# Patient Record
Sex: FEMALE | Race: OTHER | HISPANIC OR LATINO
[De-identification: names, ages, dates, MRNs, and addresses within clinical notes are randomized per-mention and may not be internally consistent; named-entity substitution may affect disease eponyms.]

---

## 2021-08-02 PROBLEM — Z00.00 ENCOUNTER FOR PREVENTIVE HEALTH EXAMINATION: Status: ACTIVE | Noted: 2021-08-02

## 2021-08-12 ENCOUNTER — APPOINTMENT (OUTPATIENT)
Dept: OBGYN | Facility: CLINIC | Age: 31
End: 2021-08-12
Payer: COMMERCIAL

## 2021-08-12 ENCOUNTER — ASOB RESULT (OUTPATIENT)
Age: 31
End: 2021-08-12

## 2021-08-12 VITALS
WEIGHT: 159 LBS | SYSTOLIC BLOOD PRESSURE: 114 MMHG | DIASTOLIC BLOOD PRESSURE: 69 MMHG | BODY MASS INDEX: 26.49 KG/M2 | HEIGHT: 65 IN | HEART RATE: 83 BPM

## 2021-08-12 DIAGNOSIS — Z80.9 FAMILY HISTORY OF MALIGNANT NEOPLASM, UNSPECIFIED: ICD-10-CM

## 2021-08-12 DIAGNOSIS — Z78.9 OTHER SPECIFIED HEALTH STATUS: ICD-10-CM

## 2021-08-12 DIAGNOSIS — Z83.3 FAMILY HISTORY OF DIABETES MELLITUS: ICD-10-CM

## 2021-08-12 DIAGNOSIS — Z82.49 FAMILY HISTORY OF ISCHEMIC HEART DISEASE AND OTHER DISEASES OF THE CIRCULATORY SYSTEM: ICD-10-CM

## 2021-08-12 DIAGNOSIS — Z83.49 FAMILY HISTORY OF OTHER ENDOCRINE, NUTRITIONAL AND METABOLIC DISEASES: ICD-10-CM

## 2021-08-12 DIAGNOSIS — Z32.01 ENCOUNTER FOR PREGNANCY TEST, RESULT POSITIVE: ICD-10-CM

## 2021-08-12 PROCEDURE — 76801 OB US < 14 WKS SINGLE FETUS: CPT

## 2021-08-12 PROCEDURE — 99203 OFFICE O/P NEW LOW 30 MIN: CPT

## 2021-09-04 PROBLEM — Z82.49 FAMILY HISTORY OF HYPERTENSION: Status: ACTIVE | Noted: 2021-09-04

## 2021-09-04 PROBLEM — Z83.3 FAMILY HISTORY OF DIABETES MELLITUS: Status: ACTIVE | Noted: 2021-09-04

## 2021-09-04 PROBLEM — Z83.49 FAMILY HISTORY OF DWARFISM: Status: ACTIVE | Noted: 2021-09-04

## 2021-09-04 PROBLEM — Z80.9 FAMILY HISTORY OF MALIGNANT NEOPLASM: Status: ACTIVE | Noted: 2021-09-04

## 2021-09-04 PROBLEM — Z78.9 DOES NOT USE ILLICIT DRUGS: Status: ACTIVE | Noted: 2021-09-04

## 2021-09-04 PROBLEM — Z78.9 NO PERTINENT PAST MEDICAL HISTORY: Status: RESOLVED | Noted: 2021-09-04 | Resolved: 2021-09-04

## 2021-09-04 LAB
ABO + RH PNL BLD: NORMAL
AR GENE MUT ANL BLD/T: NORMAL
B19V IGG SER QL IA: 0.33 INDEX
B19V IGG+IGM SER-IMP: NEGATIVE
B19V IGG+IGM SER-IMP: NORMAL
B19V IGM FLD-ACNC: 0.2 INDEX
B19V IGM SER-ACNC: NEGATIVE
BACTERIA UR CULT: NORMAL
BASOPHILS # BLD AUTO: 0.02 K/UL
BASOPHILS NFR BLD AUTO: 0.3 %
BLD GP AB SCN SERPL QL: NORMAL
C TRACH RRNA SPEC QL NAA+PROBE: NOT DETECTED
CFTR MUT TESTED BLD/T: NEGATIVE
CMV IGG SERPL QL: 4.1 U/ML
CMV IGG SERPL-IMP: POSITIVE
CMV IGM SERPL QL: <8 AU/ML
CMV IGM SERPL QL: NEGATIVE
EOSINOPHIL # BLD AUTO: 0.05 K/UL
EOSINOPHIL NFR BLD AUTO: 0.7 %
FMR1 GENE MUT ANL BLD/T: NORMAL
HBV SURFACE AG SER QL: NONREACTIVE
HCT VFR BLD CALC: 45.5 %
HCV AB SER QL: NONREACTIVE
HCV S/CO RATIO: 0.1 S/CO
HGB A MFR BLD: 97 %
HGB A2 MFR BLD: 1.5 %
HGB BLD-MCNC: 15.2 G/DL
HGB F MFR BLD: 0.3 %
HGB FRACT BLD-IMP: NORMAL
HGB OTHER MFR BLD ELPH: 1.2 %
HIV1+2 AB SPEC QL IA.RAPID: NONREACTIVE
IMM GRANULOCYTES NFR BLD AUTO: 0.4 %
LEAD BLD-MCNC: <1 UG/DL
LYMPHOCYTES # BLD AUTO: 2.18 K/UL
LYMPHOCYTES NFR BLD AUTO: 30.8 %
MAN DIFF?: NORMAL
MCHC RBC-ENTMCNC: 32.2 PG
MCHC RBC-ENTMCNC: 33.4 GM/DL
MCV RBC AUTO: 96.4 FL
MONOCYTES # BLD AUTO: 0.87 K/UL
MONOCYTES NFR BLD AUTO: 12.3 %
N GONORRHOEA RRNA SPEC QL NAA+PROBE: NOT DETECTED
NEUTROPHILS # BLD AUTO: 3.93 K/UL
NEUTROPHILS NFR BLD AUTO: 55.5 %
PLATELET # BLD AUTO: 278 K/UL
RBC # BLD: 4.72 M/UL
RBC # FLD: 13.1 %
RUBV IGG FLD-ACNC: 17.2 INDEX
RUBV IGG SER-IMP: POSITIVE
SOURCE AMPLIFICATION: NORMAL
T PALLIDUM AB SER QL IA: NEGATIVE
VZV AB TITR SER: POSITIVE
VZV IGG SER IF-ACNC: 2101 INDEX
WBC # FLD AUTO: 7.08 K/UL

## 2021-09-04 RX ORDER — ASCORBIC ACID, CHOLECALCIFEROL, .ALPHA.-TOCOPHEROL ACETATE, DL-, PYRIDOXINE, FOLIC ACID, CYANOCOBALAMIN, CALCIUM, FERROUS FUMARATE, MAGNESIUM, DOCONEXENT 85; 200; 10; 25; 1; 12; 140; 27; 45; 300 [IU]/1; [IU]/1; [IU]/1; [IU]/1; MG/1; UG/1; MG/1; MG/1; MG/1; MG/1
27-0.6-0.4-3 CAPSULE, GELATIN COATED ORAL
Refills: 0 | Status: ACTIVE | COMMUNITY

## 2021-09-07 ENCOUNTER — APPOINTMENT (OUTPATIENT)
Dept: OBGYN | Facility: CLINIC | Age: 31
End: 2021-09-07
Payer: COMMERCIAL

## 2021-09-07 VITALS
SYSTOLIC BLOOD PRESSURE: 121 MMHG | WEIGHT: 160 LBS | BODY MASS INDEX: 26.66 KG/M2 | HEIGHT: 65 IN | DIASTOLIC BLOOD PRESSURE: 73 MMHG

## 2021-09-07 PROCEDURE — 0502F SUBSEQUENT PRENATAL CARE: CPT

## 2021-09-08 ENCOUNTER — APPOINTMENT (OUTPATIENT)
Dept: ANTEPARTUM | Facility: CLINIC | Age: 31
End: 2021-09-08

## 2021-09-13 ENCOUNTER — APPOINTMENT (OUTPATIENT)
Dept: ANTEPARTUM | Facility: CLINIC | Age: 31
End: 2021-09-13
Payer: COMMERCIAL

## 2021-09-13 ENCOUNTER — ASOB RESULT (OUTPATIENT)
Age: 31
End: 2021-09-13

## 2021-09-13 ENCOUNTER — TRANSCRIPTION ENCOUNTER (OUTPATIENT)
Age: 31
End: 2021-09-13

## 2021-09-13 PROCEDURE — 76813 OB US NUCHAL MEAS 1 GEST: CPT

## 2021-09-13 PROCEDURE — 76801 OB US < 14 WKS SINGLE FETUS: CPT

## 2021-09-13 PROCEDURE — 36416 COLLJ CAPILLARY BLOOD SPEC: CPT

## 2021-09-20 ENCOUNTER — APPOINTMENT (OUTPATIENT)
Dept: MATERNAL FETAL MEDICINE | Facility: CLINIC | Age: 31
End: 2021-09-20
Payer: COMMERCIAL

## 2021-09-20 ENCOUNTER — ASOB RESULT (OUTPATIENT)
Age: 31
End: 2021-09-20

## 2021-09-20 PROCEDURE — 99202 OFFICE O/P NEW SF 15 MIN: CPT | Mod: 95

## 2021-10-04 ENCOUNTER — APPOINTMENT (OUTPATIENT)
Dept: MATERNAL FETAL MEDICINE | Facility: CLINIC | Age: 31
End: 2021-10-04

## 2021-10-12 ENCOUNTER — APPOINTMENT (OUTPATIENT)
Dept: OBGYN | Facility: CLINIC | Age: 31
End: 2021-10-12
Payer: COMMERCIAL

## 2021-10-12 VITALS
TEMPERATURE: 96.5 F | WEIGHT: 163 LBS | HEART RATE: 74 BPM | BODY MASS INDEX: 27.16 KG/M2 | SYSTOLIC BLOOD PRESSURE: 119 MMHG | DIASTOLIC BLOOD PRESSURE: 72 MMHG | HEIGHT: 65 IN

## 2021-10-12 PROCEDURE — 0502F SUBSEQUENT PRENATAL CARE: CPT

## 2021-11-01 ENCOUNTER — APPOINTMENT (OUTPATIENT)
Dept: ANTEPARTUM | Facility: CLINIC | Age: 31
End: 2021-11-01
Payer: COMMERCIAL

## 2021-11-01 ENCOUNTER — ASOB RESULT (OUTPATIENT)
Age: 31
End: 2021-11-01

## 2021-11-01 PROCEDURE — 76805 OB US >/= 14 WKS SNGL FETUS: CPT

## 2021-11-09 ENCOUNTER — NON-APPOINTMENT (OUTPATIENT)
Age: 31
End: 2021-11-09

## 2021-11-09 ENCOUNTER — APPOINTMENT (OUTPATIENT)
Dept: OBGYN | Facility: CLINIC | Age: 31
End: 2021-11-09
Payer: COMMERCIAL

## 2021-11-09 VITALS
HEIGHT: 65 IN | WEIGHT: 170 LBS | DIASTOLIC BLOOD PRESSURE: 72 MMHG | BODY MASS INDEX: 28.32 KG/M2 | SYSTOLIC BLOOD PRESSURE: 113 MMHG

## 2021-11-09 PROCEDURE — 0502F SUBSEQUENT PRENATAL CARE: CPT

## 2021-12-09 ENCOUNTER — APPOINTMENT (OUTPATIENT)
Dept: OBGYN | Facility: CLINIC | Age: 31
End: 2021-12-09
Payer: COMMERCIAL

## 2021-12-09 VITALS
DIASTOLIC BLOOD PRESSURE: 75 MMHG | BODY MASS INDEX: 28.99 KG/M2 | HEART RATE: 78 BPM | SYSTOLIC BLOOD PRESSURE: 113 MMHG | HEIGHT: 65 IN | WEIGHT: 174 LBS

## 2021-12-09 DIAGNOSIS — Z34.02 ENCOUNTER FOR SUPERVISION OF NORMAL FIRST PREGNANCY, SECOND TRIMESTER: ICD-10-CM

## 2021-12-09 PROCEDURE — 0502F SUBSEQUENT PRENATAL CARE: CPT

## 2021-12-09 RX ORDER — VITAMIN C, CALCIUM, IRON, VITAMIN D3, VITAMIN E, THIAMIN, RIBOFLAVIN, NIACINAMIDE, VITAMIN B6, FOLIC ACID, IODINE, ZINC, COPPER, DOCUSATE SODIUM
27-1 & 250 KIT
Qty: 30 | Refills: 6 | Status: ACTIVE | COMMUNITY
Start: 2021-12-09 | End: 1900-01-01

## 2022-01-06 ENCOUNTER — NON-APPOINTMENT (OUTPATIENT)
Age: 32
End: 2022-01-06

## 2022-01-06 ENCOUNTER — APPOINTMENT (OUTPATIENT)
Dept: OBGYN | Facility: CLINIC | Age: 32
End: 2022-01-06
Payer: COMMERCIAL

## 2022-01-06 VITALS
BODY MASS INDEX: 29.66 KG/M2 | WEIGHT: 178 LBS | SYSTOLIC BLOOD PRESSURE: 123 MMHG | HEIGHT: 65 IN | DIASTOLIC BLOOD PRESSURE: 83 MMHG

## 2022-01-06 DIAGNOSIS — Z23 ENCOUNTER FOR IMMUNIZATION: ICD-10-CM

## 2022-01-06 PROCEDURE — 0502F SUBSEQUENT PRENATAL CARE: CPT

## 2022-01-19 ENCOUNTER — APPOINTMENT (OUTPATIENT)
Dept: OBGYN | Facility: CLINIC | Age: 32
End: 2022-01-19

## 2022-01-20 ENCOUNTER — APPOINTMENT (OUTPATIENT)
Dept: ANTEPARTUM | Facility: CLINIC | Age: 32
End: 2022-01-20
Payer: COMMERCIAL

## 2022-01-20 ENCOUNTER — ASOB RESULT (OUTPATIENT)
Age: 32
End: 2022-01-20

## 2022-01-20 PROCEDURE — 76819 FETAL BIOPHYS PROFIL W/O NST: CPT

## 2022-01-20 PROCEDURE — 76816 OB US FOLLOW-UP PER FETUS: CPT

## 2022-01-21 ENCOUNTER — APPOINTMENT (OUTPATIENT)
Dept: OBGYN | Facility: CLINIC | Age: 32
End: 2022-01-21
Payer: COMMERCIAL

## 2022-01-21 VITALS
WEIGHT: 187 LBS | DIASTOLIC BLOOD PRESSURE: 75 MMHG | HEIGHT: 65 IN | BODY MASS INDEX: 31.16 KG/M2 | HEART RATE: 106 BPM | SYSTOLIC BLOOD PRESSURE: 111 MMHG

## 2022-01-21 PROCEDURE — 0502F SUBSEQUENT PRENATAL CARE: CPT

## 2022-02-01 ENCOUNTER — APPOINTMENT (OUTPATIENT)
Dept: OBGYN | Facility: CLINIC | Age: 32
End: 2022-02-01
Payer: COMMERCIAL

## 2022-02-01 VITALS
SYSTOLIC BLOOD PRESSURE: 114 MMHG | HEIGHT: 65 IN | WEIGHT: 185 LBS | HEART RATE: 90 BPM | DIASTOLIC BLOOD PRESSURE: 80 MMHG | BODY MASS INDEX: 30.82 KG/M2

## 2022-02-01 DIAGNOSIS — O36.60X0 MATERNAL CARE FOR EXCESSIVE FETAL GROWTH, UNSPECIFIED TRIMESTER, NOT APPLICABLE OR UNSPECIFIED: ICD-10-CM

## 2022-02-01 PROCEDURE — 0502F SUBSEQUENT PRENATAL CARE: CPT

## 2022-02-03 ENCOUNTER — TRANSCRIPTION ENCOUNTER (OUTPATIENT)
Age: 32
End: 2022-02-03

## 2022-02-15 ENCOUNTER — APPOINTMENT (OUTPATIENT)
Dept: OBGYN | Facility: CLINIC | Age: 32
End: 2022-02-15
Payer: COMMERCIAL

## 2022-02-15 VITALS
HEIGHT: 65 IN | HEART RATE: 84 BPM | BODY MASS INDEX: 31.49 KG/M2 | WEIGHT: 189 LBS | SYSTOLIC BLOOD PRESSURE: 116 MMHG | DIASTOLIC BLOOD PRESSURE: 82 MMHG

## 2022-02-15 LAB
1ST TRIMESTER DATA: NORMAL
2ND TRIMESTER DATA: NORMAL
AFP PNL SERPL: NORMAL
AFP SERPL-ACNC: NORMAL
AFP SERPL-ACNC: NORMAL
B-HCG FREE SERPL-MCNC: NORMAL
BASOPHILS # BLD AUTO: 0.05 K/UL
BASOPHILS NFR BLD AUTO: 0.5 %
CLINICAL BIOCHEMIST REVIEW: NORMAL
EOSINOPHIL # BLD AUTO: 0.07 K/UL
EOSINOPHIL NFR BLD AUTO: 0.6 %
FREE BETA HCG 1ST TRIMESTER: NORMAL
GLUCOSE 1H P 100 G GLC PO SERPL-MCNC: 158 MG/DL
GLUCOSE 1H P 50 G GLC PO SERPL-MCNC: 59 MG/DL
GLUCOSE 2H P CHAL SERPL-MCNC: 113 MG/DL
GLUCOSE 3H P CHAL SERPL-MCNC: 80 MG/DL
GLUCOSE BS SERPL-MCNC: 77 MG/DL
HCT VFR BLD CALC: 38.1 %
HGB BLD-MCNC: 12.5 G/DL
IMM GRANULOCYTES NFR BLD AUTO: 2.6 %
INHIBIN A SERPL-MCNC: NORMAL
LYMPHOCYTES # BLD AUTO: 1.99 K/UL
LYMPHOCYTES NFR BLD AUTO: 18.2 %
MAN DIFF?: NORMAL
MCHC RBC-ENTMCNC: 32.8 GM/DL
MCHC RBC-ENTMCNC: 32.8 PG
MCV RBC AUTO: 100 FL
MONOCYTES # BLD AUTO: 1.06 K/UL
MONOCYTES NFR BLD AUTO: 9.7 %
NEUTROPHILS # BLD AUTO: 7.5 K/UL
NEUTROPHILS NFR BLD AUTO: 68.4 %
NOTES NTD: NORMAL
NT: NORMAL
PAPP-A SERPL-ACNC: NORMAL
PLATELET # BLD AUTO: 264 K/UL
RBC # BLD: 3.81 M/UL
RBC # FLD: 13.6 %
T PALLIDUM AB SER QL IA: NEGATIVE
U ESTRIOL SERPL-SCNC: NORMAL
WBC # FLD AUTO: 10.95 K/UL

## 2022-02-15 PROCEDURE — 0502F SUBSEQUENT PRENATAL CARE: CPT

## 2022-02-23 ENCOUNTER — APPOINTMENT (OUTPATIENT)
Dept: OBGYN | Facility: CLINIC | Age: 32
End: 2022-02-23
Payer: COMMERCIAL

## 2022-02-23 VITALS
WEIGHT: 192.5 LBS | SYSTOLIC BLOOD PRESSURE: 132 MMHG | TEMPERATURE: 98.4 F | HEART RATE: 97 BPM | DIASTOLIC BLOOD PRESSURE: 81 MMHG | BODY MASS INDEX: 32.07 KG/M2 | HEIGHT: 65 IN

## 2022-02-23 VITALS — HEART RATE: 85 BPM | SYSTOLIC BLOOD PRESSURE: 117 MMHG | DIASTOLIC BLOOD PRESSURE: 77 MMHG

## 2022-02-23 PROCEDURE — 0502F SUBSEQUENT PRENATAL CARE: CPT

## 2022-03-03 ENCOUNTER — APPOINTMENT (OUTPATIENT)
Dept: OBGYN | Facility: CLINIC | Age: 32
End: 2022-03-03

## 2022-03-03 ENCOUNTER — NON-APPOINTMENT (OUTPATIENT)
Age: 32
End: 2022-03-03

## 2022-03-03 VITALS
BODY MASS INDEX: 31.99 KG/M2 | HEIGHT: 65 IN | WEIGHT: 192 LBS | SYSTOLIC BLOOD PRESSURE: 124 MMHG | HEART RATE: 82 BPM | DIASTOLIC BLOOD PRESSURE: 83 MMHG

## 2022-03-07 LAB
GP B STREP DNA SPEC QL NAA+PROBE: NORMAL
GP B STREP DNA SPEC QL NAA+PROBE: NOT DETECTED
SOURCE GBS: NORMAL

## 2022-03-10 ENCOUNTER — APPOINTMENT (OUTPATIENT)
Dept: OBGYN | Facility: CLINIC | Age: 32
End: 2022-03-10
Payer: COMMERCIAL

## 2022-03-10 VITALS
DIASTOLIC BLOOD PRESSURE: 83 MMHG | WEIGHT: 193 LBS | HEART RATE: 91 BPM | BODY MASS INDEX: 32.15 KG/M2 | SYSTOLIC BLOOD PRESSURE: 122 MMHG | HEIGHT: 65 IN

## 2022-03-10 PROCEDURE — 0502F SUBSEQUENT PRENATAL CARE: CPT

## 2022-03-14 ENCOUNTER — NON-APPOINTMENT (OUTPATIENT)
Age: 32
End: 2022-03-14

## 2022-03-15 ENCOUNTER — APPOINTMENT (OUTPATIENT)
Dept: OBGYN | Facility: CLINIC | Age: 32
End: 2022-03-15

## 2022-03-15 VITALS
TEMPERATURE: 96.5 F | DIASTOLIC BLOOD PRESSURE: 86 MMHG | HEART RATE: 84 BPM | WEIGHT: 195 LBS | HEIGHT: 65 IN | SYSTOLIC BLOOD PRESSURE: 131 MMHG | BODY MASS INDEX: 32.49 KG/M2

## 2022-03-15 VITALS — SYSTOLIC BLOOD PRESSURE: 121 MMHG | DIASTOLIC BLOOD PRESSURE: 82 MMHG | HEART RATE: 83 BPM

## 2022-03-17 ENCOUNTER — APPOINTMENT (OUTPATIENT)
Dept: ANTEPARTUM | Facility: CLINIC | Age: 32
End: 2022-03-17

## 2022-03-17 ENCOUNTER — ASOB RESULT (OUTPATIENT)
Age: 32
End: 2022-03-17

## 2022-03-17 ENCOUNTER — APPOINTMENT (OUTPATIENT)
Dept: ANTEPARTUM | Facility: CLINIC | Age: 32
End: 2022-03-17
Payer: COMMERCIAL

## 2022-03-17 PROCEDURE — 76816 OB US FOLLOW-UP PER FETUS: CPT

## 2022-03-17 PROCEDURE — 76818 FETAL BIOPHYS PROFILE W/NST: CPT

## 2022-03-21 ENCOUNTER — ASOB RESULT (OUTPATIENT)
Age: 32
End: 2022-03-21

## 2022-03-21 ENCOUNTER — APPOINTMENT (OUTPATIENT)
Dept: ANTEPARTUM | Facility: CLINIC | Age: 32
End: 2022-03-21
Payer: COMMERCIAL

## 2022-03-21 ENCOUNTER — NON-APPOINTMENT (OUTPATIENT)
Age: 32
End: 2022-03-21

## 2022-03-21 PROCEDURE — 76818 FETAL BIOPHYS PROFILE W/NST: CPT

## 2022-03-22 ENCOUNTER — INPATIENT (INPATIENT)
Facility: HOSPITAL | Age: 32
LOS: 2 days | Discharge: HOME HEALTH | End: 2022-03-25
Attending: OBSTETRICS & GYNECOLOGY | Admitting: OBSTETRICS & GYNECOLOGY
Payer: COMMERCIAL

## 2022-03-22 VITALS
OXYGEN SATURATION: 100 % | HEIGHT: 65 IN | TEMPERATURE: 98 F | RESPIRATION RATE: 16 BRPM | HEART RATE: 96 BPM | WEIGHT: 158.07 LBS | DIASTOLIC BLOOD PRESSURE: 90 MMHG | SYSTOLIC BLOOD PRESSURE: 138 MMHG

## 2022-03-22 DIAGNOSIS — O48.0 POST-TERM PREGNANCY: ICD-10-CM

## 2022-03-22 LAB
BASOPHILS # BLD AUTO: 0.05 K/UL — SIGNIFICANT CHANGE UP (ref 0–0.2)
BASOPHILS NFR BLD AUTO: 0.5 % — SIGNIFICANT CHANGE UP (ref 0–2)
EOSINOPHIL # BLD AUTO: 0.07 K/UL — SIGNIFICANT CHANGE UP (ref 0–0.5)
EOSINOPHIL NFR BLD AUTO: 0.7 % — SIGNIFICANT CHANGE UP (ref 0–6)
HCT VFR BLD CALC: 40.5 % — SIGNIFICANT CHANGE UP (ref 34.5–45)
HGB BLD-MCNC: 13.2 G/DL — SIGNIFICANT CHANGE UP (ref 11.5–15.5)
IANC: 6.21 K/UL — SIGNIFICANT CHANGE UP (ref 1.5–8.5)
IMM GRANULOCYTES NFR BLD AUTO: 1.1 % — SIGNIFICANT CHANGE UP (ref 0–1.5)
LYMPHOCYTES # BLD AUTO: 3.3 K/UL — SIGNIFICANT CHANGE UP (ref 1–3.3)
LYMPHOCYTES # BLD AUTO: 30.9 % — SIGNIFICANT CHANGE UP (ref 13–44)
MCHC RBC-ENTMCNC: 30.1 PG — SIGNIFICANT CHANGE UP (ref 27–34)
MCHC RBC-ENTMCNC: 32.6 GM/DL — SIGNIFICANT CHANGE UP (ref 32–36)
MCV RBC AUTO: 92.5 FL — SIGNIFICANT CHANGE UP (ref 80–100)
MONOCYTES # BLD AUTO: 0.93 K/UL — HIGH (ref 0–0.9)
MONOCYTES NFR BLD AUTO: 8.7 % — SIGNIFICANT CHANGE UP (ref 2–14)
NEUTROPHILS # BLD AUTO: 6.21 K/UL — SIGNIFICANT CHANGE UP (ref 1.8–7.4)
NEUTROPHILS NFR BLD AUTO: 58.1 % — SIGNIFICANT CHANGE UP (ref 43–77)
NRBC # BLD: 0 /100 WBCS — SIGNIFICANT CHANGE UP
NRBC # FLD: 0 K/UL — SIGNIFICANT CHANGE UP
PLATELET # BLD AUTO: 253 K/UL — SIGNIFICANT CHANGE UP (ref 150–400)
RBC # BLD: 4.38 M/UL — SIGNIFICANT CHANGE UP (ref 3.8–5.2)
RBC # FLD: 15 % — HIGH (ref 10.3–14.5)
WBC # BLD: 10.68 K/UL — HIGH (ref 3.8–10.5)
WBC # FLD AUTO: 10.68 K/UL — HIGH (ref 3.8–10.5)

## 2022-03-22 RX ORDER — CITRIC ACID/SODIUM CITRATE 300-500 MG
15 SOLUTION, ORAL ORAL EVERY 6 HOURS
Refills: 0 | Status: DISCONTINUED | OUTPATIENT
Start: 2022-03-22 | End: 2022-03-23

## 2022-03-22 RX ORDER — SODIUM CHLORIDE 9 MG/ML
1000 INJECTION, SOLUTION INTRAVENOUS
Refills: 0 | Status: DISCONTINUED | OUTPATIENT
Start: 2022-03-22 | End: 2022-03-23

## 2022-03-22 RX ORDER — OXYTOCIN 10 UNIT/ML
333.33 VIAL (ML) INJECTION
Qty: 20 | Refills: 0 | Status: DISCONTINUED | OUTPATIENT
Start: 2022-03-22 | End: 2022-03-23

## 2022-03-22 RX ADMIN — SODIUM CHLORIDE 125 MILLILITER(S): 9 INJECTION, SOLUTION INTRAVENOUS at 23:52

## 2022-03-22 NOTE — OB RN PATIENT PROFILE - MENTAL HEALTH CONDITIONS/SYMPTOMS, PROFILE
none pt with 1.5 inch lac to back of his head on the right side. pt states the last thing he remembered was working on a part and then he woke up in the hospital with no memory of what happened. pt denies incontinence. pt reports hx of one prior seizure. pt had some nausea and dizziness prior to seizure and has not eaten since last night. pt is on plavix and reports biting his tounge

## 2022-03-22 NOTE — OB PROVIDER H&P - HISTORY OF PRESENT ILLNESS
HPI: Pt is a 31y  @ 41.0 presenting for LT-iol  FM (+)  LOF (-)  CTX (-)  VB (-)  GBS neg  EFW: 4400 (3/21). Negative 3hr GTT    PNC uncomplicated     OBHx: Above, D&C x2   GynHx: Denies hx of fibroids, STDs, or other gynecologic issues  PMHx: Denies  PSHx: D&C x2   Med: PNV  All: NKDA  Psych: Denies hx of mental health issues  SH: Denies hx of smoking, drinking, or drug usage during the pregnancy    Vital Signs Last 24 Hrs  T(C): 36.8 (22 Mar 2022 22:15), Max: 36.8 (22 Mar 2022 22:15)  T(F): 98.2 (22 Mar 2022 22:15), Max: 98.2 (22 Mar 2022 22:15)  HR: 96 (22 Mar 2022 22:19) (96 - 96)  BP: 138/90 (22 Mar 2022 22:19) (138/90 - 138/90)  BP(mean): --  RR: 16 (22 Mar 2022 22:15) (16 - 16)  SpO2: 98% (22 Mar 2022 22:18) (98% - 100%)    SVE: /50/-3  FHT: 150/mod/(+)accels/(-)decels  [place]: Irreg   [place]: Vertex

## 2022-03-22 NOTE — OB PROVIDER H&P - ASSESSMENT
A/P: Pt is a 31y  who presents for LT-iol    1. Admit to LND. Routine Labs. IVF  2. IOL via PO Misoprostol and CRB when amendable   3. Fetus: Cat 1 tracing, Vertex  4. Macrosomia, will place 2U of pRBCs on hold   5. GBS status neg  6. Pain: IV pain meds/epidural PRN  7. PNC   - HbsAg and HIV negative     Buster Martinez, PGY-1  Obstetrics and Gynecology    Discussed with Dr. Paz

## 2022-03-23 ENCOUNTER — TRANSCRIPTION ENCOUNTER (OUTPATIENT)
Age: 32
End: 2022-03-23

## 2022-03-23 LAB
ALBUMIN SERPL ELPH-MCNC: 3 G/DL — LOW (ref 3.3–5)
ALBUMIN SERPL ELPH-MCNC: 3.4 G/DL — SIGNIFICANT CHANGE UP (ref 3.3–5)
ALP SERPL-CCNC: 242 U/L — HIGH (ref 40–120)
ALP SERPL-CCNC: 281 U/L — HIGH (ref 40–120)
ALT FLD-CCNC: 395 U/L — HIGH (ref 4–33)
ALT FLD-CCNC: 441 U/L — HIGH (ref 4–33)
ANION GAP SERPL CALC-SCNC: 13 MMOL/L — SIGNIFICANT CHANGE UP (ref 7–14)
ANION GAP SERPL CALC-SCNC: 14 MMOL/L — SIGNIFICANT CHANGE UP (ref 7–14)
APPEARANCE UR: CLEAR — SIGNIFICANT CHANGE UP
APTT BLD: 27 SEC — SIGNIFICANT CHANGE UP (ref 27–36.3)
APTT BLD: 27.2 SEC — SIGNIFICANT CHANGE UP (ref 27–36.3)
AST SERPL-CCNC: 200 U/L — HIGH (ref 4–32)
AST SERPL-CCNC: 221 U/L — HIGH (ref 4–32)
BASOPHILS # BLD AUTO: 0.04 K/UL — SIGNIFICANT CHANGE UP (ref 0–0.2)
BASOPHILS # BLD AUTO: 0.05 K/UL — SIGNIFICANT CHANGE UP (ref 0–0.2)
BASOPHILS NFR BLD AUTO: 0.2 % — SIGNIFICANT CHANGE UP (ref 0–2)
BASOPHILS NFR BLD AUTO: 0.3 % — SIGNIFICANT CHANGE UP (ref 0–2)
BILIRUB SERPL-MCNC: 0.3 MG/DL — SIGNIFICANT CHANGE UP (ref 0.2–1.2)
BILIRUB SERPL-MCNC: 0.3 MG/DL — SIGNIFICANT CHANGE UP (ref 0.2–1.2)
BILIRUB UR-MCNC: NEGATIVE — SIGNIFICANT CHANGE UP
BLD GP AB SCN SERPL QL: NEGATIVE — SIGNIFICANT CHANGE UP
BUN SERPL-MCNC: 11 MG/DL — SIGNIFICANT CHANGE UP (ref 7–23)
BUN SERPL-MCNC: 12 MG/DL — SIGNIFICANT CHANGE UP (ref 7–23)
CALCIUM SERPL-MCNC: 9.3 MG/DL — SIGNIFICANT CHANGE UP (ref 8.4–10.5)
CALCIUM SERPL-MCNC: 9.6 MG/DL — SIGNIFICANT CHANGE UP (ref 8.4–10.5)
CHLORIDE SERPL-SCNC: 106 MMOL/L — SIGNIFICANT CHANGE UP (ref 98–107)
CHLORIDE SERPL-SCNC: 106 MMOL/L — SIGNIFICANT CHANGE UP (ref 98–107)
CO2 SERPL-SCNC: 16 MMOL/L — LOW (ref 22–31)
CO2 SERPL-SCNC: 16 MMOL/L — LOW (ref 22–31)
COLOR SPEC: SIGNIFICANT CHANGE UP
COVID-19 SPIKE DOMAIN AB INTERP: POSITIVE
COVID-19 SPIKE DOMAIN ANTIBODY RESULT: >250 U/ML — HIGH
CREAT ?TM UR-MCNC: 32 MG/DL — SIGNIFICANT CHANGE UP
CREAT SERPL-MCNC: 0.9 MG/DL — SIGNIFICANT CHANGE UP (ref 0.5–1.3)
CREAT SERPL-MCNC: 0.95 MG/DL — SIGNIFICANT CHANGE UP (ref 0.5–1.3)
DIFF PNL FLD: ABNORMAL
EGFR: 83 ML/MIN/1.73M2 — SIGNIFICANT CHANGE UP
EGFR: 89 ML/MIN/1.73M2 — SIGNIFICANT CHANGE UP
EOSINOPHIL # BLD AUTO: 0 K/UL — SIGNIFICANT CHANGE UP (ref 0–0.5)
EOSINOPHIL # BLD AUTO: 0 K/UL — SIGNIFICANT CHANGE UP (ref 0–0.5)
EOSINOPHIL NFR BLD AUTO: 0 % — SIGNIFICANT CHANGE UP (ref 0–6)
EOSINOPHIL NFR BLD AUTO: 0 % — SIGNIFICANT CHANGE UP (ref 0–6)
FIBRINOGEN PPP-MCNC: 535 MG/DL — HIGH (ref 330–520)
FIBRINOGEN PPP-MCNC: 573 MG/DL — HIGH (ref 330–520)
GLUCOSE SERPL-MCNC: 101 MG/DL — HIGH (ref 70–99)
GLUCOSE SERPL-MCNC: 85 MG/DL — SIGNIFICANT CHANGE UP (ref 70–99)
GLUCOSE UR QL: NEGATIVE — SIGNIFICANT CHANGE UP
HCT VFR BLD CALC: 38.1 % — SIGNIFICANT CHANGE UP (ref 34.5–45)
HCT VFR BLD CALC: 42.4 % — SIGNIFICANT CHANGE UP (ref 34.5–45)
HGB BLD-MCNC: 13.2 G/DL — SIGNIFICANT CHANGE UP (ref 11.5–15.5)
HGB BLD-MCNC: 14.1 G/DL — SIGNIFICANT CHANGE UP (ref 11.5–15.5)
IANC: 10.25 K/UL — HIGH (ref 1.8–7.4)
IANC: 15.58 K/UL — HIGH (ref 1.8–7.4)
IMM GRANULOCYTES NFR BLD AUTO: 1.2 % — SIGNIFICANT CHANGE UP (ref 0–1.5)
IMM GRANULOCYTES NFR BLD AUTO: 1.3 % — SIGNIFICANT CHANGE UP (ref 0–1.5)
INR BLD: 0.91 RATIO — SIGNIFICANT CHANGE UP (ref 0.88–1.16)
INR BLD: 0.93 RATIO — SIGNIFICANT CHANGE UP (ref 0.88–1.16)
KETONES UR-MCNC: NEGATIVE — SIGNIFICANT CHANGE UP
LDH SERPL L TO P-CCNC: 325 U/L — HIGH (ref 135–225)
LDH SERPL L TO P-CCNC: 340 U/L — HIGH (ref 135–225)
LEUKOCYTE ESTERASE UR-ACNC: NEGATIVE — SIGNIFICANT CHANGE UP
LYMPHOCYTES # BLD AUTO: 1.61 K/UL — SIGNIFICANT CHANGE UP (ref 1–3.3)
LYMPHOCYTES # BLD AUTO: 21.3 % — SIGNIFICANT CHANGE UP (ref 13–44)
LYMPHOCYTES # BLD AUTO: 3.17 K/UL — SIGNIFICANT CHANGE UP (ref 1–3.3)
LYMPHOCYTES # BLD AUTO: 8.3 % — LOW (ref 13–44)
MCHC RBC-ENTMCNC: 30.6 PG — SIGNIFICANT CHANGE UP (ref 27–34)
MCHC RBC-ENTMCNC: 31.4 PG — SIGNIFICANT CHANGE UP (ref 27–34)
MCHC RBC-ENTMCNC: 33.3 GM/DL — SIGNIFICANT CHANGE UP (ref 32–36)
MCHC RBC-ENTMCNC: 34.6 GM/DL — SIGNIFICANT CHANGE UP (ref 32–36)
MCV RBC AUTO: 90.5 FL — SIGNIFICANT CHANGE UP (ref 80–100)
MCV RBC AUTO: 92 FL — SIGNIFICANT CHANGE UP (ref 80–100)
MONOCYTES # BLD AUTO: 1.21 K/UL — HIGH (ref 0–0.9)
MONOCYTES # BLD AUTO: 1.9 K/UL — HIGH (ref 0–0.9)
MONOCYTES NFR BLD AUTO: 8.1 % — SIGNIFICANT CHANGE UP (ref 2–14)
MONOCYTES NFR BLD AUTO: 9.8 % — SIGNIFICANT CHANGE UP (ref 2–14)
NEUTROPHILS # BLD AUTO: 10.25 K/UL — HIGH (ref 1.8–7.4)
NEUTROPHILS # BLD AUTO: 15.58 K/UL — HIGH (ref 1.8–7.4)
NEUTROPHILS NFR BLD AUTO: 69.1 % — SIGNIFICANT CHANGE UP (ref 43–77)
NEUTROPHILS NFR BLD AUTO: 80.4 % — HIGH (ref 43–77)
NITRITE UR-MCNC: NEGATIVE — SIGNIFICANT CHANGE UP
NRBC # BLD: 0 /100 WBCS — SIGNIFICANT CHANGE UP
NRBC # BLD: 0 /100 WBCS — SIGNIFICANT CHANGE UP
NRBC # FLD: 0 K/UL — SIGNIFICANT CHANGE UP
NRBC # FLD: 0 K/UL — SIGNIFICANT CHANGE UP
PH UR: 6.5 — SIGNIFICANT CHANGE UP (ref 5–8)
PLATELET # BLD AUTO: 251 K/UL — SIGNIFICANT CHANGE UP (ref 150–400)
PLATELET # BLD AUTO: 256 K/UL — SIGNIFICANT CHANGE UP (ref 150–400)
POTASSIUM SERPL-MCNC: 4.1 MMOL/L — SIGNIFICANT CHANGE UP (ref 3.5–5.3)
POTASSIUM SERPL-MCNC: 4.5 MMOL/L — SIGNIFICANT CHANGE UP (ref 3.5–5.3)
POTASSIUM SERPL-SCNC: 4.1 MMOL/L — SIGNIFICANT CHANGE UP (ref 3.5–5.3)
POTASSIUM SERPL-SCNC: 4.5 MMOL/L — SIGNIFICANT CHANGE UP (ref 3.5–5.3)
PROT ?TM UR-MCNC: 7 MG/DL — SIGNIFICANT CHANGE UP
PROT ?TM UR-MCNC: 7 MG/DL — SIGNIFICANT CHANGE UP
PROT SERPL-MCNC: 5.8 G/DL — LOW (ref 6–8.3)
PROT SERPL-MCNC: 6.4 G/DL — SIGNIFICANT CHANGE UP (ref 6–8.3)
PROT UR-MCNC: NEGATIVE — SIGNIFICANT CHANGE UP
PROT/CREAT UR-RTO: 0.2 RATIO — SIGNIFICANT CHANGE UP (ref 0–0.2)
PROTHROM AB SERPL-ACNC: 10.5 SEC — SIGNIFICANT CHANGE UP (ref 10.5–13.4)
PROTHROM AB SERPL-ACNC: 10.8 SEC — SIGNIFICANT CHANGE UP (ref 10.5–13.4)
RBC # BLD: 4.21 M/UL — SIGNIFICANT CHANGE UP (ref 3.8–5.2)
RBC # BLD: 4.61 M/UL — SIGNIFICANT CHANGE UP (ref 3.8–5.2)
RBC # FLD: 15.1 % — HIGH (ref 10.3–14.5)
RBC # FLD: 15.3 % — HIGH (ref 10.3–14.5)
RH IG SCN BLD-IMP: POSITIVE — SIGNIFICANT CHANGE UP
RH IG SCN BLD-IMP: POSITIVE — SIGNIFICANT CHANGE UP
SARS-COV-2 IGG+IGM SERPL QL IA: >250 U/ML — HIGH
SARS-COV-2 IGG+IGM SERPL QL IA: POSITIVE
SARS-COV-2 RNA SPEC QL NAA+PROBE: SIGNIFICANT CHANGE UP
SODIUM SERPL-SCNC: 135 MMOL/L — SIGNIFICANT CHANGE UP (ref 135–145)
SODIUM SERPL-SCNC: 136 MMOL/L — SIGNIFICANT CHANGE UP (ref 135–145)
SP GR SPEC: 1.01 — SIGNIFICANT CHANGE UP (ref 1–1.05)
T PALLIDUM AB TITR SER: NEGATIVE — SIGNIFICANT CHANGE UP
URATE SERPL-MCNC: 4.9 MG/DL — SIGNIFICANT CHANGE UP (ref 2.5–7)
UROBILINOGEN FLD QL: SIGNIFICANT CHANGE UP
WBC # BLD: 15.18 K/UL — HIGH (ref 3.8–10.5)
WBC # BLD: 19.38 K/UL — HIGH (ref 3.8–10.5)
WBC # FLD AUTO: 15.18 K/UL — HIGH (ref 3.8–10.5)
WBC # FLD AUTO: 19.38 K/UL — HIGH (ref 3.8–10.5)

## 2022-03-23 PROCEDURE — 59400 OBSTETRICAL CARE: CPT | Mod: U9,UB,GC

## 2022-03-23 RX ORDER — IBUPROFEN 200 MG
1 TABLET ORAL
Qty: 0 | Refills: 0 | DISCHARGE
Start: 2022-03-23

## 2022-03-23 RX ORDER — PRAMOXINE HYDROCHLORIDE 150 MG/15G
1 AEROSOL, FOAM RECTAL EVERY 4 HOURS
Refills: 0 | Status: DISCONTINUED | OUTPATIENT
Start: 2022-03-23 | End: 2022-03-25

## 2022-03-23 RX ORDER — OXYCODONE HYDROCHLORIDE 5 MG/1
5 TABLET ORAL ONCE
Refills: 0 | Status: DISCONTINUED | OUTPATIENT
Start: 2022-03-23 | End: 2022-03-25

## 2022-03-23 RX ORDER — KETOROLAC TROMETHAMINE 30 MG/ML
30 SYRINGE (ML) INJECTION ONCE
Refills: 0 | Status: DISCONTINUED | OUTPATIENT
Start: 2022-03-23 | End: 2022-03-23

## 2022-03-23 RX ORDER — OXYTOCIN 10 UNIT/ML
333.33 VIAL (ML) INJECTION
Qty: 20 | Refills: 0 | Status: DISCONTINUED | OUTPATIENT
Start: 2022-03-23 | End: 2022-03-24

## 2022-03-23 RX ORDER — MAGNESIUM SULFATE 500 MG/ML
4 VIAL (ML) INJECTION ONCE
Refills: 0 | Status: COMPLETED | OUTPATIENT
Start: 2022-03-23 | End: 2022-03-23

## 2022-03-23 RX ORDER — BENZOCAINE 10 %
1 GEL (GRAM) MUCOUS MEMBRANE EVERY 6 HOURS
Refills: 0 | Status: DISCONTINUED | OUTPATIENT
Start: 2022-03-23 | End: 2022-03-25

## 2022-03-23 RX ORDER — MAGNESIUM HYDROXIDE 400 MG/1
30 TABLET, CHEWABLE ORAL
Refills: 0 | Status: DISCONTINUED | OUTPATIENT
Start: 2022-03-23 | End: 2022-03-25

## 2022-03-23 RX ORDER — DIBUCAINE 1 %
1 OINTMENT (GRAM) RECTAL EVERY 6 HOURS
Refills: 0 | Status: DISCONTINUED | OUTPATIENT
Start: 2022-03-23 | End: 2022-03-25

## 2022-03-23 RX ORDER — OXYCODONE HYDROCHLORIDE 5 MG/1
5 TABLET ORAL
Refills: 0 | Status: DISCONTINUED | OUTPATIENT
Start: 2022-03-23 | End: 2022-03-25

## 2022-03-23 RX ORDER — IBUPROFEN 200 MG
600 TABLET ORAL EVERY 6 HOURS
Refills: 0 | Status: COMPLETED | OUTPATIENT
Start: 2022-03-23 | End: 2023-02-19

## 2022-03-23 RX ORDER — AER TRAVELER 0.5 G/1
1 SOLUTION RECTAL; TOPICAL EVERY 4 HOURS
Refills: 0 | Status: DISCONTINUED | OUTPATIENT
Start: 2022-03-23 | End: 2022-03-25

## 2022-03-23 RX ORDER — MAGNESIUM SULFATE 500 MG/ML
2 VIAL (ML) INJECTION
Qty: 40 | Refills: 0 | Status: DISCONTINUED | OUTPATIENT
Start: 2022-03-23 | End: 2022-03-24

## 2022-03-23 RX ORDER — TETANUS TOXOID, REDUCED DIPHTHERIA TOXOID AND ACELLULAR PERTUSSIS VACCINE, ADSORBED 5; 2.5; 8; 8; 2.5 [IU]/.5ML; [IU]/.5ML; UG/.5ML; UG/.5ML; UG/.5ML
0.5 SUSPENSION INTRAMUSCULAR ONCE
Refills: 0 | Status: DISCONTINUED | OUTPATIENT
Start: 2022-03-23 | End: 2022-03-25

## 2022-03-23 RX ORDER — LANOLIN
1 OINTMENT (GRAM) TOPICAL EVERY 6 HOURS
Refills: 0 | Status: DISCONTINUED | OUTPATIENT
Start: 2022-03-23 | End: 2022-03-25

## 2022-03-23 RX ORDER — ACETAMINOPHEN 500 MG
975 TABLET ORAL
Refills: 0 | Status: DISCONTINUED | OUTPATIENT
Start: 2022-03-23 | End: 2022-03-24

## 2022-03-23 RX ORDER — SIMETHICONE 80 MG/1
80 TABLET, CHEWABLE ORAL EVERY 4 HOURS
Refills: 0 | Status: DISCONTINUED | OUTPATIENT
Start: 2022-03-23 | End: 2022-03-25

## 2022-03-23 RX ORDER — HYDROCORTISONE 1 %
1 OINTMENT (GRAM) TOPICAL EVERY 6 HOURS
Refills: 0 | Status: DISCONTINUED | OUTPATIENT
Start: 2022-03-23 | End: 2022-03-25

## 2022-03-23 RX ORDER — DIPHENHYDRAMINE HCL 50 MG
25 CAPSULE ORAL EVERY 6 HOURS
Refills: 0 | Status: DISCONTINUED | OUTPATIENT
Start: 2022-03-23 | End: 2022-03-25

## 2022-03-23 RX ORDER — SODIUM CHLORIDE 9 MG/ML
3 INJECTION INTRAMUSCULAR; INTRAVENOUS; SUBCUTANEOUS EVERY 8 HOURS
Refills: 0 | Status: DISCONTINUED | OUTPATIENT
Start: 2022-03-23 | End: 2022-03-25

## 2022-03-23 RX ADMIN — Medication 50 GM/HR: at 22:24

## 2022-03-23 RX ADMIN — Medication 30 MILLIGRAM(S): at 18:15

## 2022-03-23 RX ADMIN — Medication 30 MILLIGRAM(S): at 18:10

## 2022-03-23 RX ADMIN — Medication 300 GRAM(S): at 21:56

## 2022-03-23 RX ADMIN — Medication 1000 MILLIUNIT(S)/MIN: at 17:45

## 2022-03-23 NOTE — DISCHARGE NOTE OB - MATERIALS PROVIDED
Vaccinations/[facility]  Screening Program/  Immunization Record/Breastfeeding Log/Bottle Feeding Log/Breastfeeding Mother’s Support Group Information/Guide to Postpartum Care/[facility] Hearing Screen Program/Back To Sleep Handout/Shaken Baby Prevention Handout/Breastfeeding Guide and Packet/Birth Certificate Instructions

## 2022-03-23 NOTE — OB PROVIDER LABOR PROGRESS NOTE - NS_SUBJECTIVE/OBJECTIVE_OBGYN_ALL_OB_FT
pt seen and examined for pressure
Patient seen and examined for category II tracing and rectal pressure. Patient comfortable with epidural, no complaints at this time.  VS  T(C): 36.4 (03-23-22 @ 11:45)  HR: 91 (03-23-22 @ 13:21)  BP: 118/71 (03-23-22 @ 13:16)  RR: 17 (03-23-22 @ 11:45)  SpO2: 100% (03-23-22 @ 13:21)
Pt seen for placement of a cervical balloon.
Pt with 8/10 pain requesting pain medications. Pt examined cervical balloon found to be in vaginal vault. Balloon removed and pt exam reveals a 5.5 cm cervix with bulging membranes. Denies VB/LOF. +FM. Pt for epidural.

## 2022-03-23 NOTE — OB PROVIDER LABOR PROGRESS NOTE - ASSESSMENT
Cervical change noted  Positive fetal scalp stimulation with vaginal exam  Patient repositioned with peanut ball  IV bolus initiated by RN  Exp management  d/w Dr. Alfredo Villavicencio NP  
P0 at 41+ weeks IOL for late term would like epidural for pain  - patient to be transferred to L&D for epidural   -cont. tavares/renate Adame FNP-BC
CB instilled with 60/60 cc of normal saline.   Pt tolerated the procedure well   Continue PO cytotec     Plan per Dr. Jackson Bhatti, PGY-1

## 2022-03-23 NOTE — OB RN DELIVERY SUMMARY - NS_SEPSISRSKCALC_OBGYN_ALL_OB_FT
EOS calculated successfully. EOS Risk Factor: 0.5/1000 live births ([place] national incidence); GA=41w1d; Temp=98.2; ROM=4.533; GBS='Negative'; Antibiotics='No antibiotics or any antibiotics < 2 hrs prior to birth'

## 2022-03-23 NOTE — DISCHARGE NOTE OB - MEDICATION SUMMARY - MEDICATIONS TO TAKE
I will START or STAY ON the medications listed below when I get home from the hospital:    ibuprofen 600 mg oral tablet  -- 1 tab(s) by mouth every 6 hours  -- Indication: For Pain   I will START or STAY ON the medications listed below when I get home from the hospital:    ibuprofen 600 mg oral tablet  -- 1 tab(s) by mouth every 6 hours, As Needed  -- Indication: For Pain    acetaminophen 325 mg oral tablet  -- 2 tab(s) by mouth every 6 hours, As Needed  -- Indication: For Pain    Prenatal Multivitamins with Folic Acid 1 mg oral tablet  -- 1 tab(s) by mouth once a day  -- Indication: For Vitamins

## 2022-03-23 NOTE — OB PROVIDER DELIVERY SUMMARY - NSPROVIDERDELIVERYNOTE_OBGYN_ALL_OB_FT
Spontaneous vaginal delivery of liveborn infant from OA position. Head, shoulders, and body delivered easily. Infant was suctioned. Thick mec. Delayed cord clamping and infant was passed to mother. Cord clamped and cut. Placenta delivered intact. Fundal massage was given and uterine fundus was found to be firm. Vaginal exam revealed an intact cervix, vaginal walls and sulci. Patient had a 2nd degree laceration in the perineum that was repaired with 2.0 chromic suture. Excellent hemostasis was noted. Patient was stable and went to recovery. Count was correct x 2.    Radha Chacorta PGY1

## 2022-03-23 NOTE — DISCHARGE NOTE OB - NS MD DC FALL RISK RISK
For information on Fall & Injury Prevention, visit: [email or web address] OR  [email or web address] OR  https://www.cdc.gov/steadi/patient.html

## 2022-03-23 NOTE — OB PROVIDER LABOR PROGRESS NOTE - NS_OBIHIFHRDETAILS_OBGYN_ALL_OB_FT
130, mod, + early decels, no accels
130 minimal variability no accels occasional early decel
140 minimal variability/-accels/+decels
140. Moderate variability. + Accels - Decels

## 2022-03-23 NOTE — OB RN DELIVERY SUMMARY - NSSELHIDDEN_OBGYN_ALL_OB_FT
[NS_DeliveryAttending1_OBGYN_ALL_OB_FT:[id number]==],[NS_DeliveryAssist1_OBGYN_ALL_OB_FT:[id number]],[NS_DeliveryRN_OBGYN_ALL_OB_FT:[id number]==]

## 2022-03-23 NOTE — OB PROVIDER DELIVERY SUMMARY - NSSELHIDDEN_OBGYN_ALL_OB_FT
[NS_DeliveryAttending1_OBGYN_ALL_OB_FT:[id number]==],[NS_DeliveryAssist1_OBGYN_ALL_OB_FT:[id number]=],[NS_DeliveryRN_OBGYN_ALL_OB_FT:[id number]==]

## 2022-03-23 NOTE — OB NEONATOLOGY/PEDIATRICIAN DELIVERY SUMMARY - NSPEDSNEONOTESA_OBGYN_ALL_OB_FT
Called to delivery for category 2 tracing. 41.1 wk female born via  to a 30 y/o  blood type O+ mother. No significant maternal or prenatal history. PNL -/-/NR/I, GBS - on 2/15. AROM at 12:34 with initially clear fluids with thick meconium at delivery, approx. 4.5 hrs. Baby emerged vigorous, crying, was w/d/s/s with APGARS of 8/9. Mom plans to initiate breastfeeding, consents Hep B vaccine. EOS 0.07. COVID negative. Admit to  nursery.

## 2022-03-23 NOTE — PROGRESS NOTE ADULT - SUBJECTIVE AND OBJECTIVE BOX
Pt comfortable with epidural   pos accels moderate variability, no decels  toco q 3-5min  VE 6-7/90/-3  AROM--clear  IOL, s/p cytotec  Allow labor

## 2022-03-23 NOTE — DISCHARGE NOTE OB - CARE PROVIDER_API CALL
Soledad Fuentes (MD)  Obstetrics and Gynecology  [facility] [place] [address]  Phone: [phone]  Fax: [phone]  Follow Up Time:

## 2022-03-23 NOTE — DISCHARGE NOTE OB - PATIENT PORTAL LINK FT
You can access the FollowMyHealth Patient Portal offered by [facility] by registering at the following website: [email or web address]. By joining [facility]’s FollowMyHealth portal, you will also be able to view your health information using other applications (apps) compatible with our system.

## 2022-03-23 NOTE — DISCHARGE NOTE OB - HOSPITAL COURSE
30 y/o P0 @ 41+ weeks admitted for induction of labor. Delivered viable female infant. Postpartum course uncomplicated.

## 2022-03-23 NOTE — CHART NOTE - NSCHARTNOTEFT_GEN_A_CORE
R4 Chart Note    Patient had multiple 140s systolic during labor course and a few diastolic in 90s. Majority were when patient was in pain or was laying on cuff but has had others that met criteria for gestational hypertension. HELLP labs were sent.    AST/ALT:221/441, otherwise labs wnl    Patient will have HELLP labs repeated at 8pm and if still abnormal, will start Magnesium and discuss diagnosis of severe preeclampsia.    D/w Dr. Benny Barbosa PGY-4 R4 Chart Note    Patient had multiple 140s systolic during labor course and a few diastolic in 90s. Majority were when patient was in pain or was laying on cuff but has had others that met criteria for gestational hypertension. HELLP labs were sent.  AST/ALT:221/441, otherwise labs wnl    Patient will have HELLP labs repeated at 8pm and if still abnormal, will start Magnesium and discuss diagnosis of severe preeclampsia.    D/w Dr. Benny Barbosa PGY-4    Addendum:   Late entry: Pt seen and spoken to at 19:30 on 3/24   Pt seen after patient after transaminitis was detected on CMP. Repeat CMP demonstrated AST/ALT of 200/395   Pt feels well. Denies symptoms of HA, visual changes, or RUQ pain.   Diagnosis and criteria of SPEC was explained. The need for blood pressure control and Magnesium was explained.   The increased risk for heart disease was detailed and the patient was made aware that she will need to follow with a cardiologist as an outpatient.   Pt verbalized understanding and all questions were answered   Magnesium was initiated.     Alejandra Bhatti, PGY-1

## 2022-03-24 LAB
ALBUMIN SERPL ELPH-MCNC: 3 G/DL — LOW (ref 3.3–5)
ALBUMIN SERPL ELPH-MCNC: 3 G/DL — LOW (ref 3.3–5)
ALP SERPL-CCNC: 218 U/L — HIGH (ref 40–120)
ALP SERPL-CCNC: 218 U/L — HIGH (ref 40–120)
ALT FLD-CCNC: 279 U/L — HIGH (ref 4–33)
ALT FLD-CCNC: 323 U/L — HIGH (ref 4–33)
ANION GAP SERPL CALC-SCNC: 12 MMOL/L — SIGNIFICANT CHANGE UP (ref 7–14)
ANION GAP SERPL CALC-SCNC: 12 MMOL/L — SIGNIFICANT CHANGE UP (ref 7–14)
APTT BLD: 28.9 SEC — SIGNIFICANT CHANGE UP (ref 27–36.3)
AST SERPL-CCNC: 105 U/L — HIGH (ref 4–32)
AST SERPL-CCNC: 135 U/L — HIGH (ref 4–32)
BASOPHILS # BLD AUTO: 0.04 K/UL — SIGNIFICANT CHANGE UP (ref 0–0.2)
BASOPHILS # BLD AUTO: 0.05 K/UL — SIGNIFICANT CHANGE UP (ref 0–0.2)
BASOPHILS NFR BLD AUTO: 0.2 % — SIGNIFICANT CHANGE UP (ref 0–2)
BASOPHILS NFR BLD AUTO: 0.3 % — SIGNIFICANT CHANGE UP (ref 0–2)
BILIRUB SERPL-MCNC: 0.2 MG/DL — SIGNIFICANT CHANGE UP (ref 0.2–1.2)
BILIRUB SERPL-MCNC: <0.2 MG/DL — SIGNIFICANT CHANGE UP (ref 0.2–1.2)
BUN SERPL-MCNC: 10 MG/DL — SIGNIFICANT CHANGE UP (ref 7–23)
BUN SERPL-MCNC: 13 MG/DL — SIGNIFICANT CHANGE UP (ref 7–23)
CALCIUM SERPL-MCNC: 7.3 MG/DL — LOW (ref 8.4–10.5)
CALCIUM SERPL-MCNC: 8.5 MG/DL — SIGNIFICANT CHANGE UP (ref 8.4–10.5)
CHLORIDE SERPL-SCNC: 101 MMOL/L — SIGNIFICANT CHANGE UP (ref 98–107)
CHLORIDE SERPL-SCNC: 107 MMOL/L — SIGNIFICANT CHANGE UP (ref 98–107)
CO2 SERPL-SCNC: 18 MMOL/L — LOW (ref 22–31)
CO2 SERPL-SCNC: 19 MMOL/L — LOW (ref 22–31)
CREAT SERPL-MCNC: 0.93 MG/DL — SIGNIFICANT CHANGE UP (ref 0.5–1.3)
CREAT SERPL-MCNC: 1.04 MG/DL — SIGNIFICANT CHANGE UP (ref 0.5–1.3)
EGFR: 75 ML/MIN/1.73M2 — SIGNIFICANT CHANGE UP
EGFR: 85 ML/MIN/1.73M2 — SIGNIFICANT CHANGE UP
EOSINOPHIL # BLD AUTO: 0.05 K/UL — SIGNIFICANT CHANGE UP (ref 0–0.5)
EOSINOPHIL # BLD AUTO: 0.06 K/UL — SIGNIFICANT CHANGE UP (ref 0–0.5)
EOSINOPHIL NFR BLD AUTO: 0.3 % — SIGNIFICANT CHANGE UP (ref 0–6)
EOSINOPHIL NFR BLD AUTO: 0.3 % — SIGNIFICANT CHANGE UP (ref 0–6)
FIBRINOGEN PPP-MCNC: 584 MG/DL — HIGH (ref 330–520)
GLUCOSE SERPL-MCNC: 115 MG/DL — HIGH (ref 70–99)
GLUCOSE SERPL-MCNC: 95 MG/DL — SIGNIFICANT CHANGE UP (ref 70–99)
HCT VFR BLD CALC: 35 % — SIGNIFICANT CHANGE UP (ref 34.5–45)
HCT VFR BLD CALC: 38 % — SIGNIFICANT CHANGE UP (ref 34.5–45)
HGB BLD-MCNC: 11.6 G/DL — SIGNIFICANT CHANGE UP (ref 11.5–15.5)
HGB BLD-MCNC: 12.4 G/DL — SIGNIFICANT CHANGE UP (ref 11.5–15.5)
IANC: 13.28 K/UL — HIGH (ref 1.8–7.4)
IANC: 15.19 K/UL — HIGH (ref 1.8–7.4)
IMM GRANULOCYTES NFR BLD AUTO: 0.9 % — SIGNIFICANT CHANGE UP (ref 0–1.5)
IMM GRANULOCYTES NFR BLD AUTO: 1 % — SIGNIFICANT CHANGE UP (ref 0–1.5)
INR BLD: 0.88 RATIO — SIGNIFICANT CHANGE UP (ref 0.88–1.16)
LDH SERPL L TO P-CCNC: 278 U/L — HIGH (ref 135–225)
LDH SERPL L TO P-CCNC: 344 U/L — HIGH (ref 135–225)
LYMPHOCYTES # BLD AUTO: 15.2 % — SIGNIFICANT CHANGE UP (ref 13–44)
LYMPHOCYTES # BLD AUTO: 18.6 % — SIGNIFICANT CHANGE UP (ref 13–44)
LYMPHOCYTES # BLD AUTO: 3.07 K/UL — SIGNIFICANT CHANGE UP (ref 1–3.3)
LYMPHOCYTES # BLD AUTO: 3.38 K/UL — HIGH (ref 1–3.3)
MAGNESIUM SERPL-MCNC: 5.4 MG/DL — HIGH (ref 1.6–2.6)
MAGNESIUM SERPL-MCNC: 6.2 MG/DL — HIGH (ref 1.6–2.6)
MCHC RBC-ENTMCNC: 30.4 PG — SIGNIFICANT CHANGE UP (ref 27–34)
MCHC RBC-ENTMCNC: 30.8 PG — SIGNIFICANT CHANGE UP (ref 27–34)
MCHC RBC-ENTMCNC: 32.6 GM/DL — SIGNIFICANT CHANGE UP (ref 32–36)
MCHC RBC-ENTMCNC: 33.1 GM/DL — SIGNIFICANT CHANGE UP (ref 32–36)
MCV RBC AUTO: 91.6 FL — SIGNIFICANT CHANGE UP (ref 80–100)
MCV RBC AUTO: 94.3 FL — SIGNIFICANT CHANGE UP (ref 80–100)
MONOCYTES # BLD AUTO: 1.22 K/UL — HIGH (ref 0–0.9)
MONOCYTES # BLD AUTO: 1.58 K/UL — HIGH (ref 0–0.9)
MONOCYTES NFR BLD AUTO: 6.7 % — SIGNIFICANT CHANGE UP (ref 2–14)
MONOCYTES NFR BLD AUTO: 7.8 % — SIGNIFICANT CHANGE UP (ref 2–14)
NEUTROPHILS # BLD AUTO: 13.28 K/UL — HIGH (ref 1.8–7.4)
NEUTROPHILS # BLD AUTO: 15.19 K/UL — HIGH (ref 1.8–7.4)
NEUTROPHILS NFR BLD AUTO: 73.2 % — SIGNIFICANT CHANGE UP (ref 43–77)
NEUTROPHILS NFR BLD AUTO: 75.5 % — SIGNIFICANT CHANGE UP (ref 43–77)
NRBC # BLD: 0 /100 WBCS — SIGNIFICANT CHANGE UP
NRBC # BLD: 0 /100 WBCS — SIGNIFICANT CHANGE UP
NRBC # FLD: 0 K/UL — SIGNIFICANT CHANGE UP
NRBC # FLD: 0 K/UL — SIGNIFICANT CHANGE UP
PLATELET # BLD AUTO: 256 K/UL — SIGNIFICANT CHANGE UP (ref 150–400)
PLATELET # BLD AUTO: 266 K/UL — SIGNIFICANT CHANGE UP (ref 150–400)
POTASSIUM SERPL-MCNC: 3.8 MMOL/L — SIGNIFICANT CHANGE UP (ref 3.5–5.3)
POTASSIUM SERPL-MCNC: 4.1 MMOL/L — SIGNIFICANT CHANGE UP (ref 3.5–5.3)
POTASSIUM SERPL-SCNC: 3.8 MMOL/L — SIGNIFICANT CHANGE UP (ref 3.5–5.3)
POTASSIUM SERPL-SCNC: 4.1 MMOL/L — SIGNIFICANT CHANGE UP (ref 3.5–5.3)
PROT SERPL-MCNC: 5.8 G/DL — LOW (ref 6–8.3)
PROT SERPL-MCNC: 5.8 G/DL — LOW (ref 6–8.3)
PROTHROM AB SERPL-ACNC: 10.2 SEC — LOW (ref 10.5–13.4)
RBC # BLD: 3.82 M/UL — SIGNIFICANT CHANGE UP (ref 3.8–5.2)
RBC # BLD: 4.03 M/UL — SIGNIFICANT CHANGE UP (ref 3.8–5.2)
RBC # FLD: 15.5 % — HIGH (ref 10.3–14.5)
RBC # FLD: 15.7 % — HIGH (ref 10.3–14.5)
SODIUM SERPL-SCNC: 132 MMOL/L — LOW (ref 135–145)
SODIUM SERPL-SCNC: 137 MMOL/L — SIGNIFICANT CHANGE UP (ref 135–145)
URATE SERPL-MCNC: 5.7 MG/DL — SIGNIFICANT CHANGE UP (ref 2.5–7)
URATE SERPL-MCNC: 5.8 MG/DL — SIGNIFICANT CHANGE UP (ref 2.5–7)
WBC # BLD: 18.55 K/UL — HIGH (ref 3.8–10.5)
WBC # BLD: 20.15 K/UL — HIGH (ref 3.8–10.5)
WBC # FLD AUTO: 18.55 K/UL — HIGH (ref 3.8–10.5)
WBC # FLD AUTO: 20.15 K/UL — HIGH (ref 3.8–10.5)

## 2022-03-24 RX ORDER — IBUPROFEN 200 MG
600 TABLET ORAL EVERY 6 HOURS
Refills: 0 | Status: DISCONTINUED | OUTPATIENT
Start: 2022-03-24 | End: 2022-03-25

## 2022-03-24 RX ORDER — MAGNESIUM SULFATE 500 MG/ML
2 VIAL (ML) INJECTION
Qty: 40 | Refills: 0 | Status: DISCONTINUED | OUTPATIENT
Start: 2022-03-24 | End: 2022-03-24

## 2022-03-24 RX ORDER — SODIUM CHLORIDE 9 MG/ML
1000 INJECTION, SOLUTION INTRAVENOUS
Refills: 0 | Status: DISCONTINUED | OUTPATIENT
Start: 2022-03-24 | End: 2022-03-24

## 2022-03-24 RX ADMIN — Medication 1 TABLET(S): at 14:36

## 2022-03-24 RX ADMIN — Medication 600 MILLIGRAM(S): at 22:15

## 2022-03-24 RX ADMIN — SODIUM CHLORIDE 3 MILLILITER(S): 9 INJECTION INTRAMUSCULAR; INTRAVENOUS; SUBCUTANEOUS at 06:00

## 2022-03-24 RX ADMIN — SODIUM CHLORIDE 3 MILLILITER(S): 9 INJECTION INTRAMUSCULAR; INTRAVENOUS; SUBCUTANEOUS at 22:15

## 2022-03-24 RX ADMIN — Medication 600 MILLIGRAM(S): at 23:15

## 2022-03-24 RX ADMIN — Medication 600 MILLIGRAM(S): at 15:07

## 2022-03-24 RX ADMIN — Medication 600 MILLIGRAM(S): at 14:37

## 2022-03-24 RX ADMIN — Medication 50 GM/HR: at 02:08

## 2022-03-24 RX ADMIN — SODIUM CHLORIDE 3 MILLILITER(S): 9 INJECTION INTRAMUSCULAR; INTRAVENOUS; SUBCUTANEOUS at 14:00

## 2022-03-24 RX ADMIN — Medication 50 GM/HR: at 07:41

## 2022-03-24 NOTE — PROGRESS NOTE ADULT - ASSESSMENT
Assessment:   32yo now postpartum day 1 from a , recovering well.     Plan:   #sPEC  - Met criteria due to elevated LFTs noted post-partum 2/2 patient complaints of shivers overnight at 2am  - Dizziness likely 2/2 magnesium.  - Magnesium d'annamaria at 12 hours after start of magnesium post-partum given pt complaint  - STAT Hellp labs for monitor LFTs  - Continue to monitor for other s/s of worsening hypertensive disease    #Routine Post-Partum Care  - Continue scheduled Ibuprofen and Acetaminophen for pain, Oxycodone available PRN for breakthrough pain.  - Increase ambulation, SCDs when not ambulating  - Continue regular diet    Amyeo Jereen, PGY-1  d/w Dr Adorno   Assessment:   32yo now postpartum day 1 from a , recovering well.     Plan:   #sPEC  - Met criteria due to elevated LFTs noted post-partum 2/2 patient complaints of shivers overnight at 2am  - Dizziness likely 2/2 magnesium.  - Magnesium d'annamaria at 12 hours after start of magnesium post-partum given pt complaint  - STAT Hellp labs for monitor LFTs  - No Tylenol for elevated LFTs  - Continue to monitor for other s/s of worsening hypertensive disease    #Routine Post-Partum Care  - Continue scheduled Ibuprofen and Acetaminophen for pain, Oxycodone available PRN for breakthrough pain.  - Increase ambulation, SCDs when not ambulating  - Continue regular diet    Amyeo Jereen, PGY-1  d/w Dr Adorno   Assessment:   32yo now postpartum day 1 from a , recovering well.     Plan:   #sPEC  - Met criteria due to elevated LFTs noted post-partum 2/2 patient complaints of shivers overnight at 2am  - Dizziness likely 2/2 magnesium.  - Magnesium d'annamaria at 12 hours after start of magnesium post-partum given pt complaint  - STAT Hellp labs for monitor LFTs  - No Tylenol for elevated LFTs  - Continue to monitor for other s/s of worsening hypertensive disease    #Routine Post-Partum Care  - Continue scheduled Ibuprofen and Acetaminophen for pain, Oxycodone available PRN for breakthrough pain.  - Increase ambulation, SCDs when not ambulating  - Continue regular diet    Amyeo Jereen, PGY-1  d/w Dr Adorno    Att:  Pt seen by me. She complained to me of dizziness and blurred vision while in bed talking to birth registrar. Magnesium sulfate discontinued after 12 hours. Mag level pending. All other labs drawn separately. She denies other complaints. Pt asked to stay at rest til dizziness and visual disturbances resolve as she reports feeling "whoozy" after up to bathroom. Increase oral hydration. Magy Adorno MD

## 2022-03-24 NOTE — PROGRESS NOTE ADULT - SUBJECTIVE AND OBJECTIVE BOX
OB Postpartum Progress Note: PPD #1     32yo now PPD #1 after  seen and examined at bedside, no acute overnight events. Patient complaining of dizziness. Her pain is well controlled. States she is ambulating, voiding spontaneously, passing gas, and tolerating regular diet. Denies CP, SOB, N/V, HA, blurred vision, epigastric pain.    Vital Signs Last 24 Hours  T(C): 36.8 (22 @ 10:00), Max: 36.9 (22 @ 06:00)  HR: 107 (22 @ 10:00) (84 - 149)  BP: 127/74 (22 @ 10:00) (107/61 - 145/86)  RR: 18 (22 @ 10:00) (16 - 18)  SpO2: 100% (22 @ 10:00) (86% - 100%)    Physical Exam:  General: NAD, resting comfortably in bed   Abdomen: Soft, non-tender, non-distended, fundus firm  Extremities: Full ROM, moving all extremities spontaneously, minimal edema  Pelvic: Lochia wnl    Labs:    Blood Type: O Positive  Antibody Screen: Negative  RPR: Negative               12.4   20.15 )-----------( 256      (  @ 04:42 )             38.0                13.2   19.38 )-----------( 251      (  @ 20:39 )             38.1                14.1   15.18 )-----------( 256      (  @ 17:57 )             42.4         MEDICATIONS  (STANDING):  diphtheria/tetanus/pertussis (acellular) Vaccine (ADAcel) 0.5 milliLiter(s) IntraMuscular once  ibuprofen  Tablet. 600 milliGRAM(s) Oral every 6 hours  magnesium sulfate Infusion 2 Gm/Hr (50 mL/Hr) IV Continuous <Continuous>  oxytocin Infusion 333.333 milliUNIT(s)/Min (1000 mL/Hr) IV Continuous <Continuous>  prenatal multivitamin 1 Tablet(s) Oral daily  sodium chloride 0.9% lock flush 3 milliLiter(s) IV Push every 8 hours    MEDICATIONS  (PRN):  benzocaine 20%/menthol 0.5% Spray 1 Spray(s) Topical every 6 hours PRN for Perineal discomfort  dibucaine 1% Ointment 1 Application(s) Topical every 6 hours PRN Perineal discomfort  diphenhydrAMINE 25 milliGRAM(s) Oral every 6 hours PRN Pruritus  hydrocortisone 1% Cream 1 Application(s) Topical every 6 hours PRN Moderate Pain (4-6)  lanolin Ointment 1 Application(s) Topical every 6 hours PRN nipple soreness  magnesium hydroxide Suspension 30 milliLiter(s) Oral two times a day PRN Constipation  oxyCODONE    IR 5 milliGRAM(s) Oral every 3 hours PRN Moderate to Severe Pain (4-10)  oxyCODONE    IR 5 milliGRAM(s) Oral once PRN Moderate to Severe Pain (4-10)  pramoxine 1%/zinc 5% Cream 1 Application(s) Topical every 4 hours PRN Moderate Pain (4-6)  simethicone 80 milliGRAM(s) Chew every 4 hours PRN Gas  witch hazel Pads 1 Application(s) Topical every 4 hours PRN Perineal discomfort

## 2022-03-24 NOTE — LACTATION INITIAL EVALUATION - LACTATION INTERVENTIONS
Reviewed pages 35-45 in the POST PARTUM book   Mother has very sensitive nipples and is unsure whether she wants to directly breastfeed.  She may want to exclusively pump.  Explored options with mother.  She will attempt breastfeeding for 24 hours and reevaluate in am./initiate/review safe skin-to-skin/initiate/review hand expression/initiate/review techniques for position and latch/initiate/review breast massage/compression/reviewed components of an effective feeding and at least 8 effective feedings per day required/reviewed importance of monitoring infant diapers, the breastfeeding log, and minimum output each day/reviewed risks of unnecessary formula supplementation/reviewed risks of artificial nipples/reviewed benefits and recommendations for rooming in/reviewed feeding on demand/by cue at least 8 times a day

## 2022-03-24 NOTE — OB POSTPARTUM EVENT NOTE - NS_EVENTPTSUMMARY1_OBGYN_ALL_OB_FT
0134    Temp: 36.9    BP: 131/81    Pulse: 100, 107    O2: 100%    Fundus firm and at umbilicus 0134    Temp: 36.9    BP: 131/81    Pulse: 100, 107    O2: 100%    Fundus firm and at umbilicus    Medication: Magnesium 2mg 50ml/hour

## 2022-03-24 NOTE — OB POSTPARTUM EVENT NOTE - NS_EVENTSUMMARY1_OBGYN_ALL_OB_FT
S/P  of full term female infant  EBL: 110  2nd Degree Laceration      Upon transfer from bed to wheelchair for transfer to postpartum unit, patient began experiencing tremors. Patient reported feeling cold but otherwise fine. Patient denies headache, dizziness, nausea, right upper quadrant pain, visual changes. Charge CHUNG Menezes called to bedside. Vital Signs within normal limits.   S/P  of full term female infant  EBL: 110  2nd Degree Laceration  Severe pre-eclampsia     Upon transfer from bed to wheelchair for transfer to postpartum unit, patient began experiencing tremors. Patient reported feeling cold but otherwise fine. Patient denies headache, dizziness, nausea, right upper quadrant pain, visual changes. Charge CHUNG Menezes called to bedside. Vital Signs within normal limits.

## 2022-03-24 NOTE — OB POSTPARTUM EVENT NOTE - NS_EVENTFINDINGS1_OBGYN_ALL_OB_FT
Upon assessment by MD Bhatti, patient approved to go to postpartum unit and have initial vital signs assessed on arrival. No rectal temperature needed per MD. No new orders at this time. Patient to continue being monitored.

## 2022-03-25 VITALS
TEMPERATURE: 99 F | DIASTOLIC BLOOD PRESSURE: 68 MMHG | RESPIRATION RATE: 18 BRPM | SYSTOLIC BLOOD PRESSURE: 106 MMHG | HEART RATE: 102 BPM | OXYGEN SATURATION: 99 %

## 2022-03-25 DIAGNOSIS — Z34.03 ENCOUNTER FOR SUPERVISION OF NORMAL FIRST PREGNANCY, THIRD TRIMESTER: ICD-10-CM

## 2022-03-25 LAB
ALBUMIN SERPL ELPH-MCNC: 2.9 G/DL — LOW (ref 3.3–5)
ALP SERPL-CCNC: 190 U/L — HIGH (ref 40–120)
ALT FLD-CCNC: 169 U/L — HIGH (ref 4–33)
ANION GAP SERPL CALC-SCNC: 13 MMOL/L — SIGNIFICANT CHANGE UP (ref 7–14)
APTT BLD: 27.5 SEC — SIGNIFICANT CHANGE UP (ref 27–36.3)
AST SERPL-CCNC: 49 U/L — HIGH (ref 4–32)
BASOPHILS # BLD AUTO: 0.05 K/UL — SIGNIFICANT CHANGE UP (ref 0–0.2)
BASOPHILS NFR BLD AUTO: 0.4 % — SIGNIFICANT CHANGE UP (ref 0–2)
BILIRUB SERPL-MCNC: <0.2 MG/DL — SIGNIFICANT CHANGE UP (ref 0.2–1.2)
BUN SERPL-MCNC: 10 MG/DL — SIGNIFICANT CHANGE UP (ref 7–23)
CALCIUM SERPL-MCNC: 8.6 MG/DL — SIGNIFICANT CHANGE UP (ref 8.4–10.5)
CHLORIDE SERPL-SCNC: 109 MMOL/L — HIGH (ref 98–107)
CO2 SERPL-SCNC: 18 MMOL/L — LOW (ref 22–31)
CREAT SERPL-MCNC: 0.96 MG/DL — SIGNIFICANT CHANGE UP (ref 0.5–1.3)
EGFR: 82 ML/MIN/1.73M2 — SIGNIFICANT CHANGE UP
EOSINOPHIL # BLD AUTO: 0.12 K/UL — SIGNIFICANT CHANGE UP (ref 0–0.5)
EOSINOPHIL NFR BLD AUTO: 1 % — SIGNIFICANT CHANGE UP (ref 0–6)
FIBRINOGEN PPP-MCNC: 614 MG/DL — HIGH (ref 330–520)
GLUCOSE SERPL-MCNC: 140 MG/DL — HIGH (ref 70–99)
HCT VFR BLD CALC: 34.2 % — LOW (ref 34.5–45)
HGB BLD-MCNC: 11.1 G/DL — LOW (ref 11.5–15.5)
IANC: 7.38 K/UL — SIGNIFICANT CHANGE UP (ref 1.8–7.4)
IMM GRANULOCYTES NFR BLD AUTO: 1.6 % — HIGH (ref 0–1.5)
INR BLD: <0.9 RATIO — LOW (ref 0.88–1.16)
LDH SERPL L TO P-CCNC: 249 U/L — HIGH (ref 135–225)
LYMPHOCYTES # BLD AUTO: 27 % — SIGNIFICANT CHANGE UP (ref 13–44)
LYMPHOCYTES # BLD AUTO: 3.1 K/UL — SIGNIFICANT CHANGE UP (ref 1–3.3)
MCHC RBC-ENTMCNC: 30.5 PG — SIGNIFICANT CHANGE UP (ref 27–34)
MCHC RBC-ENTMCNC: 32.5 GM/DL — SIGNIFICANT CHANGE UP (ref 32–36)
MCV RBC AUTO: 94 FL — SIGNIFICANT CHANGE UP (ref 80–100)
MONOCYTES # BLD AUTO: 0.64 K/UL — SIGNIFICANT CHANGE UP (ref 0–0.9)
MONOCYTES NFR BLD AUTO: 5.6 % — SIGNIFICANT CHANGE UP (ref 2–14)
NEUTROPHILS # BLD AUTO: 7.38 K/UL — SIGNIFICANT CHANGE UP (ref 1.8–7.4)
NEUTROPHILS NFR BLD AUTO: 64.4 % — SIGNIFICANT CHANGE UP (ref 43–77)
NRBC # BLD: 0 /100 WBCS — SIGNIFICANT CHANGE UP
NRBC # FLD: 0 K/UL — SIGNIFICANT CHANGE UP
PLATELET # BLD AUTO: 263 K/UL — SIGNIFICANT CHANGE UP (ref 150–400)
POTASSIUM SERPL-MCNC: 3.8 MMOL/L — SIGNIFICANT CHANGE UP (ref 3.5–5.3)
POTASSIUM SERPL-SCNC: 3.8 MMOL/L — SIGNIFICANT CHANGE UP (ref 3.5–5.3)
PROT SERPL-MCNC: 5.6 G/DL — LOW (ref 6–8.3)
PROTHROM AB SERPL-ACNC: 10.1 SEC — LOW (ref 10.5–13.4)
RBC # BLD: 3.64 M/UL — LOW (ref 3.8–5.2)
RBC # FLD: 15.9 % — HIGH (ref 10.3–14.5)
SODIUM SERPL-SCNC: 140 MMOL/L — SIGNIFICANT CHANGE UP (ref 135–145)
URATE SERPL-MCNC: 5 MG/DL — SIGNIFICANT CHANGE UP (ref 2.5–7)
WBC # BLD: 11.47 K/UL — HIGH (ref 3.8–10.5)
WBC # FLD AUTO: 11.47 K/UL — HIGH (ref 3.8–10.5)

## 2022-03-25 RX ORDER — ACETAMINOPHEN 500 MG
1 TABLET ORAL
Qty: 0 | Refills: 0 | DISCHARGE
Start: 2022-03-25

## 2022-03-25 RX ADMIN — Medication 600 MILLIGRAM(S): at 04:10

## 2022-03-25 RX ADMIN — Medication 1 TABLET(S): at 12:32

## 2022-03-25 RX ADMIN — Medication 600 MILLIGRAM(S): at 12:32

## 2022-03-25 RX ADMIN — Medication 600 MILLIGRAM(S): at 13:00

## 2022-03-25 RX ADMIN — Medication 600 MILLIGRAM(S): at 05:10

## 2022-03-25 RX ADMIN — Medication 1 APPLICATION(S): at 12:33

## 2022-03-25 RX ADMIN — Medication 1 APPLICATION(S): at 12:32

## 2022-03-25 NOTE — PROGRESS NOTE ADULT - SUBJECTIVE AND OBJECTIVE BOX
Patient seen and examined at bedside, resting comfortably in no acute distress. Denies fever, chills, nausea or vomiting. She is tolerating a regular diet. She is ambulating without difficulty and voiding spontaneously. Pain is well controlled. Bleeding is less than a normal menses. Denies HA, changes in vision, RUQ pain.     Physical Examination:  Vital Signs: Vital Signs Last 24 Hrs  T(C): 36.7 (25 Mar 2022 09:43), Max: 36.9 (25 Mar 2022 05:16)  T(F): 98 (25 Mar 2022 09:43), Max: 98.4 (25 Mar 2022 05:16)  HR: 100 (25 Mar 2022 09:43) (89 - 100)  BP: 126/78 (25 Mar 2022 09:43) (121/72 - 129/77)  BP(mean): --  RR: 18 (25 Mar 2022 09:43) (18 - 18)  SpO2: 99% (25 Mar 2022 09:43) (96% - 100%)  General: alert and oriented, no acute distress  Cardio: regular rate and rhythm  Respiratory: non labored respirations  Abdomen: soft, non-tender, non-distended, uterus firm, palpated below the umbilicus  VE: minimal lochia noted  Musculoskeletal: No erythema/edema, no calf tenderness bilaterally    MEDICATIONS  (STANDING):  diphtheria/tetanus/pertussis (acellular) Vaccine (ADAcel) 0.5 milliLiter(s) IntraMuscular once  ibuprofen  Tablet. 600 milliGRAM(s) Oral every 6 hours  prenatal multivitamin 1 Tablet(s) Oral daily  sodium chloride 0.9% lock flush 3 milliLiter(s) IV Push every 8 hours      Labs:  Blood type: O Positive  Rubella IgG: RPR: Negative                          11.6   18.55<H> >-----------< 266    (  @ 12:56 )             35.0                        12.4   20.15<H> >-----------< 256    (  @ 04:42 )             38.0                        13.2   19.38<H> >-----------< 251    (  @ 20:39 )             38.1                        14.1   15.18<H> >-----------< 256    (  @ 17:57 )             42.4                        13.2   10.68<H> >-----------< 253    (  @ 23:05 )             40.5    22 @ 12:07      137  |  107  |  10  ----------------------------<  115<H>  3.8   |  18<L>  |  0.93    22 @ 04:42      132<L>  |  101  |  13  ----------------------------<  95  4.1   |  19<L>  |  1.04    22 @ 20:39      135  |  106  |  11  ----------------------------<  101<H>  4.1   |  16<L>  |  0.90    22 @ 16:50      136  |  106  |  12  ----------------------------<  85  4.5   |  16<L>  |  0.95        Ca    7.3<L>      24 Mar 2022 12:07  Ca    8.5      24 Mar 2022 04:42  Ca    9.3      23 Mar 2022 20:39  Ca    9.6      23 Mar 2022 16:50  Mg     6.20<H>       Mg     5.40<H>         TPro  5.8<L>  /  Alb  3.0<L>  /  TBili  <0.2  /  DBili  x   /  AST  105<H>  /  ALT  279<H>  /  AlkPhos  218<H>  22 @ 12:07  TPro  5.8<L>  /  Alb  3.0<L>  /  TBili  0.2  /  DBili  x   /  AST  135<H>  /  ALT  323<H>  /  AlkPhos  218<H>  22 @ 04:42  TPro  5.8<L>  /  Alb  3.0<L>  /  TBili  0.3  /  DBili  x   /  AST  200<H>  /  ALT  395<H>  /  AlkPhos  242<H>  22 @ 20:39  TPro  6.4  /  Alb  3.4  /  TBili  0.3  /  DBili  x   /  AST  221<H>  /  ALT  441<H>  /  AlkPhos  281<H>  22 @ 16:50    Assessment and Plan:   30yo s/p  on now PPD#1 .  Patient is stable and doing well post-partum.   Blood type: O+    PEC with severe features  - BPs 120s/70s  - LFT downtrending  - s/p mag sulfate    Plan:  - VS per unit protocol  - SCDs for DVT ppx  - Regular diet  - Pain well controlled, continue current pain regimen  - Encourage ambulation  - Continue BP monitoring  - Discharge instructions reviewed  - D/c planning initiated, patient to follow up in office in 1 week for BP check and 6 weeks for post partum visit    Andrez Sharp MD

## 2022-03-25 NOTE — PROGRESS NOTE ADULT - SUBJECTIVE AND OBJECTIVE BOX
OB Progress Note:  PPD #2    S: 32yo  now PPD#2 s/p . Patient feels well. Pain is well controlled. She is tolerating a regular diet and passing flatus. She is voiding spontaneously, and ambulating without difficulty. Denies CP/SOB. Denies lightheadedness/dizziness. Denies N/V.    O:  Vitals:  Vital Signs Last 24 Hrs  T(C): 36.7 (25 Mar 2022 09:43), Max: 36.9 (25 Mar 2022 05:16)  T(F): 98 (25 Mar 2022 09:43), Max: 98.4 (25 Mar 2022 05:16)  HR: 99 (25 Mar 2022 05:16) (89 - 107)  BP: 126/78 (25 Mar 2022 09:43) (121/72 - 129/77)  BP(mean): --  RR: 18 (25 Mar 2022 09:43) (18 - 18)  SpO2: 99% (25 Mar 2022 09:43) (96% - 100%)    MEDICATIONS  (STANDING):  diphtheria/tetanus/pertussis (acellular) Vaccine (ADAcel) 0.5 milliLiter(s) IntraMuscular once  ibuprofen  Tablet. 600 milliGRAM(s) Oral every 6 hours  prenatal multivitamin 1 Tablet(s) Oral daily  sodium chloride 0.9% lock flush 3 milliLiter(s) IV Push every 8 hours      Labs:  Blood type: O Positive  Rubella IgG: RPR: Negative                          11.6   18.55<H> >-----------< 266    (  @ 12:56 )             35.0                        12.4   20.15<H> >-----------< 256    (  @ 04:42 )             38.0                        13.2   19.38<H> >-----------< 251    (  @ 20:39 )             38.1                        14.1   15.18<H> >-----------< 256    (  @ 17:57 )             42.4                        13.2   10.68<H> >-----------< 253    (  @ 23:05 )             40.5    22 @ 12:07      137  |  107  |  10  ----------------------------<  115<H>  3.8   |  18<L>  |  0.93    22 @ 04:42      132<L>  |  101  |  13  ----------------------------<  95  4.1   |  19<L>  |  1.04    22 @ 20:39      135  |  106  |  11  ----------------------------<  101<H>  4.1   |  16<L>  |  0.90    22 @ 16:50      136  |  106  |  12  ----------------------------<  85  4.5   |  16<L>  |  0.95        Ca    7.3<L>      24 Mar 2022 12:07  Ca    8.5      24 Mar 2022 04:42  Ca    9.3      23 Mar 2022 20:39  Ca    9.6      23 Mar 2022 16:50  Mg     6.20<H>       Mg     5.40<H>         TPro  5.8<L>  /  Alb  3.0<L>  /  TBili  <0.2  /  DBili  x   /  AST  105<H>  /  ALT  279<H>  /  AlkPhos  218<H>  22 @ 12:07  TPro  5.8<L>  /  Alb  3.0<L>  /  TBili  0.2  /  DBili  x   /  AST  135<H>  /  ALT  323<H>  /  AlkPhos  218<H>  22 @ 04:42  TPro  5.8<L>  /  Alb  3.0<L>  /  TBili  0.3  /  DBili  x   /  AST  200<H>  /  ALT  395<H>  /  AlkPhos  242<H>  22 @ 20:39  TPro  6.4  /  Alb  3.4  /  TBili  0.3  /  DBili  x   /  AST  221<H>  /  ALT  441<H>  /  AlkPhos  281<H>  22 @ 16:50          Physical Exam:  General: NAD  Abdomen: Soft, non-tender, non-distended, fundus firm  Vaginal: Lochia wnl  Extremities: No erythema/edema    A/P: 32yo PPD #2 s/p .  Patient is stable and doing well post-partum.   - Pain well controlled, continue current pain regimen. Standing Ibuprofen, Acetaminophen. Oxycodone available PRN for breakthrough pain.  - Increase ambulation, SCDs when not ambulating  - Continue regular diet    Amyeo Afroz Jereen, PGY-1 OB Progress Note:  PPD #2    S: 30yo  now PPD#2 s/p . Patient feels well. Pain is well controlled. She is tolerating a regular diet and passing flatus. She is voiding spontaneously, and ambulating without difficulty. Denies CP/SOB. Denies lightheadedness/dizziness. Denies N/V.    O:  Vitals:  Vital Signs Last 24 Hrs  T(C): 36.7 (25 Mar 2022 09:43), Max: 36.9 (25 Mar 2022 05:16)  T(F): 98 (25 Mar 2022 09:43), Max: 98.4 (25 Mar 2022 05:16)  HR: 99 (25 Mar 2022 05:16) (89 - 107)  BP: 126/78 (25 Mar 2022 09:43) (121/72 - 129/77)  BP(mean): --  RR: 18 (25 Mar 2022 09:43) (18 - 18)  SpO2: 99% (25 Mar 2022 09:43) (96% - 100%)    MEDICATIONS  (STANDING):  diphtheria/tetanus/pertussis (acellular) Vaccine (ADAcel) 0.5 milliLiter(s) IntraMuscular once  ibuprofen  Tablet. 600 milliGRAM(s) Oral every 6 hours  prenatal multivitamin 1 Tablet(s) Oral daily  sodium chloride 0.9% lock flush 3 milliLiter(s) IV Push every 8 hours      Labs:  Blood type: O Positive  Rubella IgG: RPR: Negative                          11.6   18.55<H> >-----------< 266    (  @ 12:56 )             35.0                        12.4   20.15<H> >-----------< 256    (  @ 04:42 )             38.0                        13.2   19.38<H> >-----------< 251    (  @ 20:39 )             38.1                        14.1   15.18<H> >-----------< 256    (  @ 17:57 )             42.4                        13.2   10.68<H> >-----------< 253    (  @ 23:05 )             40.5    22 @ 12:07      137  |  107  |  10  ----------------------------<  115<H>  3.8   |  18<L>  |  0.93    22 @ 04:42      132<L>  |  101  |  13  ----------------------------<  95  4.1   |  19<L>  |  1.04    22 @ 20:39      135  |  106  |  11  ----------------------------<  101<H>  4.1   |  16<L>  |  0.90    22 @ 16:50      136  |  106  |  12  ----------------------------<  85  4.5   |  16<L>  |  0.95        Ca    7.3<L>      24 Mar 2022 12:07  Ca    8.5      24 Mar 2022 04:42  Ca    9.3      23 Mar 2022 20:39  Ca    9.6      23 Mar 2022 16:50  Mg     6.20<H>       Mg     5.40<H>         TPro  5.8<L>  /  Alb  3.0<L>  /  TBili  <0.2  /  DBili  x   /  AST  105<H>  /  ALT  279<H>  /  AlkPhos  218<H>  22 @ 12:07  TPro  5.8<L>  /  Alb  3.0<L>  /  TBili  0.2  /  DBili  x   /  AST  135<H>  /  ALT  323<H>  /  AlkPhos  218<H>  22 @ 04:42  TPro  5.8<L>  /  Alb  3.0<L>  /  TBili  0.3  /  DBili  x   /  AST  200<H>  /  ALT  395<H>  /  AlkPhos  242<H>  22 @ 20:39  TPro  6.4  /  Alb  3.4  /  TBili  0.3  /  DBili  x   /  AST  221<H>  /  ALT  441<H>  /  AlkPhos  281<H>  22 @ 16:50          Physical Exam:  General: NAD  Abdomen: Soft, non-tender, non-distended, fundus firm  Vaginal: Lochia wnl  Extremities: No erythema/edema

## 2022-03-25 NOTE — PROGRESS NOTE ADULT - ASSESSMENT
32yo now postpartum day 2 from a , recovering well.     Plan:   #sPEC  - Met criteria due to elevated LFTs noted post-partum /2 patient complaints of shivers overnight at 2am  - Magnesium started at 10p 3/25 during post-partum course and d'annamaria after 12 hours given pt complaint of dizziness  - No Tylenol due to elevated LFTs  - AM HELLP labs to ensure downtrending LFTs  - Continue to monitor for other s/s of worsening hypertensive disease  - BPs within normal requiring no antihypertensives    #Routine Post-Partum Care  - Continue scheduled Ibuprofen and Acetaminophen for pain, Oxycodone available PRN for breakthrough pain.  - Increase ambulation, SCDs when not ambulating  - Continue regular diet    Amyeo Jereen, PGY-1

## 2022-04-27 ENCOUNTER — APPOINTMENT (OUTPATIENT)
Dept: OBGYN | Facility: CLINIC | Age: 32
End: 2022-04-27
Payer: COMMERCIAL

## 2022-04-27 VITALS
SYSTOLIC BLOOD PRESSURE: 112 MMHG | DIASTOLIC BLOOD PRESSURE: 75 MMHG | BODY MASS INDEX: 27.16 KG/M2 | WEIGHT: 163 LBS | HEIGHT: 65 IN | HEART RATE: 67 BPM

## 2022-04-27 PROCEDURE — 0503F POSTPARTUM CARE VISIT: CPT

## 2022-04-27 NOTE — HISTORY OF PRESENT ILLNESS
[Postpartum Follow Up] : postpartum follow up [Delivery Date: ___] : on [unfilled] [] : delivered by vaginal delivery [Female] : Delivery History: baby girl [Girl] : baby is a girl [Infant's Name ___] : [unfilled] [___ Lbs] : [unfilled] lbs [___ Oz] : [unfilled] oz [Living at Home] : is currently living at home [Bottle Feeding] : bottle feeding [Breastfeeding] : currently nursing [Intended Contraception] : Intended Contraception: [IUD] : intrauterine device [Back to Normal] : is back to normal in size [Normal] : the vagina was normal [Examination Of The Breasts] : breasts are normal [None] : None [Complications:___] : no complications [Resumed Menses] : has not resumed her menses [Resumed [place]] : has not resumed intercourse [de-identified] : 31 y/o P1 s/p  on 3/23/22 complicated by PEC with severe features for postpartum exam, doing well

## 2022-04-28 ENCOUNTER — APPOINTMENT (OUTPATIENT)
Dept: OBGYN | Facility: CLINIC | Age: 32
End: 2022-04-28

## 2022-04-28 LAB
C TRACH RRNA SPEC QL NAA+PROBE: NOT DETECTED
N GONORRHOEA RRNA SPEC QL NAA+PROBE: NOT DETECTED
SOURCE AMPLIFICATION: NORMAL

## 2022-04-29 ENCOUNTER — NON-APPOINTMENT (OUTPATIENT)
Age: 32
End: 2022-04-29

## 2022-06-01 ENCOUNTER — APPOINTMENT (OUTPATIENT)
Dept: OBGYN | Facility: CLINIC | Age: 32
End: 2022-06-01
Payer: COMMERCIAL

## 2022-06-01 ENCOUNTER — ASOB RESULT (OUTPATIENT)
Age: 32
End: 2022-06-01

## 2022-06-01 VITALS
SYSTOLIC BLOOD PRESSURE: 111 MMHG | HEART RATE: 99 BPM | BODY MASS INDEX: 26.49 KG/M2 | HEIGHT: 65 IN | DIASTOLIC BLOOD PRESSURE: 74 MMHG | WEIGHT: 159 LBS

## 2022-06-01 DIAGNOSIS — Z30.430 ENCOUNTER FOR INSERTION OF INTRAUTERINE CONTRACEPTIVE DEVICE: ICD-10-CM

## 2022-06-01 PROCEDURE — 58300 INSERT INTRAUTERINE DEVICE: CPT

## 2022-06-01 PROCEDURE — 76830 TRANSVAGINAL US NON-OB: CPT

## 2022-06-02 LAB
HCG UR QL: NEGATIVE
QUALITY CONTROL: YES

## 2022-10-12 NOTE — OB PROVIDER LABOR PROGRESS NOTE - NSVAGINALEXAM_OBGYN_ALL_OB_DT
23-Mar-2022 01:25
23-Mar-2022 15:55
23-Mar-2022 11:00
23-Mar-2022 01:42
Difficulty concentrating/Difficulty remembering

## 2023-01-06 ENCOUNTER — MED ADMIN CHARGE (OUTPATIENT)
Age: 33
End: 2023-01-06

## 2023-05-15 ENCOUNTER — APPOINTMENT (OUTPATIENT)
Dept: OBGYN | Facility: CLINIC | Age: 33
End: 2023-05-15
Payer: COMMERCIAL

## 2023-05-15 ENCOUNTER — ASOB RESULT (OUTPATIENT)
Age: 33
End: 2023-05-15

## 2023-05-15 VITALS
BODY MASS INDEX: 27.27 KG/M2 | WEIGHT: 163.7 LBS | HEART RATE: 72 BPM | SYSTOLIC BLOOD PRESSURE: 112 MMHG | DIASTOLIC BLOOD PRESSURE: 72 MMHG | HEIGHT: 65 IN

## 2023-05-15 DIAGNOSIS — N92.1 EXCESSIVE AND FREQUENT MENSTRUATION WITH IRREGULAR CYCLE: ICD-10-CM

## 2023-05-15 DIAGNOSIS — Z97.5 EXCESSIVE AND FREQUENT MENSTRUATION WITH IRREGULAR CYCLE: ICD-10-CM

## 2023-05-15 PROCEDURE — 76830 TRANSVAGINAL US NON-OB: CPT

## 2023-05-15 PROCEDURE — 99213 OFFICE O/P EST LOW 20 MIN: CPT | Mod: 25

## 2023-05-15 PROCEDURE — 99395 PREV VISIT EST AGE 18-39: CPT

## 2023-05-17 ENCOUNTER — TRANSCRIPTION ENCOUNTER (OUTPATIENT)
Age: 33
End: 2023-05-17

## 2023-05-17 LAB
C TRACH RRNA SPEC QL NAA+PROBE: NOT DETECTED
CYTOLOGY CVX/VAG DOC THIN PREP: NORMAL
ESTRADIOL SERPL-MCNC: 148 PG/ML
FSH SERPL-MCNC: 13.1 IU/L
HBV SURFACE AG SER QL: NONREACTIVE
HCV AB SER QL: NONREACTIVE
HCV S/CO RATIO: 0.14 S/CO
HIV1+2 AB SPEC QL IA.RAPID: NONREACTIVE
HPV HIGH+LOW RISK DNA PNL CVX: NOT DETECTED
N GONORRHOEA RRNA SPEC QL NAA+PROBE: NOT DETECTED
SOURCE AMPLIFICATION: NORMAL
T PALLIDUM AB SER QL IA: NEGATIVE
TSH SERPL-ACNC: 1.23 UIU/ML

## 2023-06-06 ENCOUNTER — NON-APPOINTMENT (OUTPATIENT)
Age: 33
End: 2023-06-06

## 2023-06-08 ENCOUNTER — NON-APPOINTMENT (OUTPATIENT)
Age: 33
End: 2023-06-08

## 2023-06-22 ENCOUNTER — APPOINTMENT (OUTPATIENT)
Dept: OBGYN | Facility: CLINIC | Age: 33
End: 2023-06-22
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
## 2024-02-13 ENCOUNTER — TRANSCRIPTION ENCOUNTER (OUTPATIENT)
Age: 34
End: 2024-02-13

## 2024-03-17 ENCOUNTER — EMERGENCY (EMERGENCY)
Facility: HOSPITAL | Age: 34
LOS: 1 days | Discharge: HOME | End: 2024-03-17
Attending: EMERGENCY MEDICINE | Admitting: EMERGENCY MEDICINE
Payer: OTHER MISCELLANEOUS

## 2024-03-17 VITALS
OXYGEN SATURATION: 97 % | RESPIRATION RATE: 19 BRPM | SYSTOLIC BLOOD PRESSURE: 121 MMHG | TEMPERATURE: 98 F | HEART RATE: 82 BPM | DIASTOLIC BLOOD PRESSURE: 76 MMHG

## 2024-03-17 PROCEDURE — 99283 EMERGENCY DEPT VISIT LOW MDM: CPT

## 2024-03-17 NOTE — ED ADULT NURSE NOTE - AS PAIN REST
Called and left VM. Called to report that her Urine Culture was negative. I still believe she has a UTI and and wanted to see if the Macrobid was helping. Please complete the antibiotic.     MD JENN Leon & BENITO THOMSON [facility]  09/03/21 0 (no pain/absence of nonverbal indicators of pain)

## 2024-03-17 NOTE — ED PROVIDER NOTE - NSFOLLOWUPINSTRUCTIONS_ED_ALL_ED_FT
Please follow up with [facility] in 3 days. See packet given to you for further instructions    Needle Stick Injuries    WHAT YOU NEED TO KNOW:    Needle stick injuries usually happen to healthcare workers in hospitals, clinics, and labs. Needle stick injuries can also happen at home or in the community if needles are not discarded properly. Used needles may have blood or body fluids that carry HIV, the hepatitis B virus (HBV), or the hepatitis C virus (HCV). The virus can spread to a person who gets pricked by a needle used on an infected person.    DISCHARGE INSTRUCTIONS:    Ways that needle stick injuries can occur: Needle stick injuries usually happen by accident. [place] may cause injury to you or to someone else if they were not properly discarded after use. An injury can also occur if you do not use gloves to protect your hands while you work with needles.     Treatment that may be given for needle stick injuries: Postexposure prophylaxis (PEP) may be needed. PEP is treatment that may protect a person from infection after exposure to another person's body fluids. PEP may be needed if the person whose fluids you were exposed to has a known infection. Do not donate blood, organs, tissues, or semen until your follow-up is completed at 6 months.    PEP for HBV may include HBV vaccinations or medicine to prevent HBV. This treatment works best if started within 24 hours of exposure.       PEP for HIV may include 2 or 3 types of medicine to prevent HIV. This treatment works best if started within 72 hours of exposure. Continue treatment for 4 weeks. Practice safe sex to prevent spreading HIV and to prevent pregnancy during the follow-up period. If you are breastfeeding, your healthcare provider may recommend that you stop. Ask your healthcare provider if you can breastfeed.       PEP for HCV is not available. You will need to be tested for HCV and treated if you were infected.    Follow up with your healthcare provider as directed: You will need more blood tests. You will also need to make sure your medicines are working. PEP for HIV often causes side effects. Talk with your healthcare provider about your symptoms. He will need to make sure you are taking the medicine correctly. Write down your questions so you remember to ask them during your visits.    Prevent needle stick injuries:     Always use gloves when you handle needles that are exposed to blood or other body fluids. You may want to use 2 pairs of gloves for extra protection.      Do not recap needles after use. Recapping needles increases your risk for a needle stick.      Throw away needles in a safe container. A hard container with a lid may prevent accidental needle sticks.

## 2024-03-17 NOTE — ED ADULT TRIAGE NOTE - CHIEF COMPLAINT QUOTE
Pt arrives ambulatory to triage from work endorsing fingerstick to right index finger. 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 Denies PMHx.

## 2024-03-17 NOTE — ED PROVIDER NOTE - PATIENT PORTAL LINK FT
You can access the FollowMyHealth Patient Portal offered by [facility] by registering at the following website: [email or web address]. By joining [facility]’s FollowMyHealth portal, you will also be able to view your health information using other applications (apps) compatible with our system.

## 2024-03-17 NOTE — ED PROVIDER NOTE - CLINICAL SUMMARY MEDICAL DECISION MAKING FREE TEXT BOX
Patient is a 33-year-old female no reported past medical history presents for a needlestick earlier today.  Patient states she was drawing blood with a butterfly and was stuck by the used needle. Minimal bleeding at this time. Endorsing no other complaints.  S  EHS packet completed, labs sent. Instructed patient to follow up with EHS

## 2024-03-17 NOTE — ED PROVIDER NOTE - OBJECTIVE STATEMENT
Patient is a 33-year-old female no reported past medical history presents for a needlestick earlier today.  Patient states she was drawing blood with a butterfly and was stuck by the used needle. Minimal bleeding at this time. Endorsing no other complaints.

## 2024-03-17 NOTE — ED PROVIDER NOTE - PHYSICAL EXAMINATION
GENERAL: NAD  HEENT:  Atraumatic  CHEST/LUNG: Chest rise equal bilaterally  HEART: Regular rate and rhythm  ABDOMEN: Soft, Nontender, Nondistended  EXTREMITIES:  Extremities warm  PSYCH: A&Ox3  SKIN: No obvious rashes or lesions. Small pinprick no bleeding at this time.   MSK: No cervical spine TTP, able to range neck to the left and right/ No midline spinal TTP/ No swelling, redness, pain or discharge from   NEUROLOGY: strength and sensation intact in all extremities. CN 2 - 12 intact. Finger to nose test intact. No pronator drift. Ambulatory without difficulty. GENERAL: NAD  HEENT:  Atraumatic  CHEST/LUNG: Chest rise equal bilaterally  HEART: Regular rate and rhythm  ABDOMEN: Soft, Nontender, Nondistended  EXTREMITIES:  Extremities warm  PSYCH: A&Ox3  SKIN: No obvious rashes or lesions. Small pinprick no bleeding at this time.   MSK: No cervical spine TTP, able to range neck to the left and right/ No midline spinal TTP/ No swelling, redness, pain or discharge from   NEUROLOGY: strength and sensation intact in all extremities. CN 2 - 12 intact. Finger to nose test intact. No pronator drift. Ambulatory without difficulty.    Attending/[place]: Well-appearing, NAD; PERRL/EOMI, non-icterus, supple, no MALAIKA, no JVD, RRR, CTAB; Abd-soft, NT/ND, no HSM; no LE edema, A&Ox3, nonfocal; Skin-warm/dry/no signs of bleeding, swelling or signs of injury

## 2024-04-25 PROBLEM — Z78.9 OTHER SPECIFIED HEALTH STATUS: Chronic | Status: ACTIVE | Noted: 2024-03-17

## 2024-05-16 ENCOUNTER — APPOINTMENT (OUTPATIENT)
Dept: OBGYN | Facility: CLINIC | Age: 34
End: 2024-05-16
Payer: COMMERCIAL

## 2024-05-16 VITALS
DIASTOLIC BLOOD PRESSURE: 72 MMHG | WEIGHT: 155 LBS | SYSTOLIC BLOOD PRESSURE: 111 MMHG | HEIGHT: 65 IN | BODY MASS INDEX: 25.83 KG/M2 | HEART RATE: 67 BPM

## 2024-05-16 DIAGNOSIS — Z01.419 ENCOUNTER FOR GYNECOLOGICAL EXAMINATION (GENERAL) (ROUTINE) W/OUT ABNORMAL FINDINGS: ICD-10-CM

## 2024-05-16 DIAGNOSIS — Z30.432 ENCOUNTER FOR REMOVAL OF INTRAUTERINE CONTRACEPTIVE DEVICE: ICD-10-CM

## 2024-05-16 PROCEDURE — 58301 REMOVE INTRAUTERINE DEVICE: CPT

## 2024-05-16 PROCEDURE — 99395 PREV VISIT EST AGE 18-39: CPT | Mod: 25

## 2024-05-16 NOTE — HISTORY OF PRESENT ILLNESS
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 Otherwise no new stressors. Would like to remove IUD, as she has been on contraception for some time, interested in conceiving soon No other changes in med/surgical history since last visit [Patient reported PAP Smear was normal] : Patient reported PAP Smear was normal [PapSmeardate] : 2023

## 2024-05-16 NOTE — HISTORY OF PRESENT ILLNESS
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 Otherwise no new stressors. Would like to remove IUD, as she has been on contraception for some time, interested in conceiving soon No other changes in med/surgical history since last visit [Patient reported PAP Smear was normal] : Patient reported PAP Smear was normal [PapSmeardate] : 2023

## 2024-05-16 NOTE — PHYSICAL EXAM
[Chaperone Present] : A chaperone was present in the examining room during all aspects of the physical examination [[id number]] : A chaperone was present during the pelvic exam. [FreeTextEntry2] : Tiara POTTER [Appropriately responsive] : appropriately responsive [Alert] : alert [No Acute Distress] : no acute distress [Regular Rate Rhythm] : regular rate rhythm [Soft] : soft [Non-tender] : non-tender [Non-distended] : non-distended [No Lesions] : no lesions [No Mass] : no mass [Oriented x3] : oriented x3 [FreeTextEntry5] : non labored breathing [Examination Of The Breasts] : a normal appearance [No Masses] : no breast masses were palpable [Labia Majora] : normal [Labia Minora] : normal [IUD String] : an IUD string was noted [Normal] : normal [Uterine Adnexae] : normal

## 2024-05-16 NOTE — PLAN
[FreeTextEntry1] : 35 y/o with IUD for well woman visit, IUD removal  Plan: - Pap test today - reassurance provided re: lack of libido, resources provided, discussed sex therapy - IUD removed (see separate procedure note) - RTO in 1 year or PRN

## 2024-05-16 NOTE — PHYSICAL EXAM
[Chaperone Present] : A chaperone was present in the examining room during all aspects of the physical examination [[id number]] : A chaperone was present during the pelvic exam. [FreeTextEntry2] : Tiara POTTER [Appropriately responsive] : appropriately responsive [Alert] : alert [No Acute Distress] : no acute distress [Regular Rate Rhythm] : regular rate rhythm [Soft] : soft [Non-tender] : non-tender [Non-distended] : non-distended [No Lesions] : no lesions [No Mass] : no mass [Oriented x3] : oriented x3 [FreeTextEntry5] : non labored breathing [Examination Of The Breasts] : a normal appearance [No Masses] : no breast masses were palpable [Labia Majora] : normal [Labia Minora] : normal [IUD String] : an IUD string was noted [Normal] : normal [Uterine Adnexae] : normal

## 2024-05-16 NOTE — PROCEDURE
[Cervical Pap Smear] : cervical Pap smear [IUD Removal] : intrauterine device (IUD) removal [Time out performed] : Pre-procedure time out performed.  Patient's name, date of birth and procedure confirmed. [Consent Obtained] : Consent obtained [Fertility Desired] : fertility desired [Risks] : risks [Benefits] : benefits [Alternatives] : alternatives [Patient] : patient [Speculum Placed] : speculum placed [Strings Visualized] : strings visualized [IUD Discarded] : IUD discarded [Tolerated Well] : Patient tolerated the procedure well [No Complications] : no complications [PRN] : as needed

## 2024-05-17 LAB — HPV HIGH+LOW RISK DNA PNL CVX: NOT DETECTED

## 2024-05-22 LAB — CYTOLOGY CVX/VAG DOC THIN PREP: NORMAL

## 2024-10-08 ENCOUNTER — ASOB RESULT (OUTPATIENT)
Age: 34
End: 2024-10-08

## 2024-10-08 ENCOUNTER — APPOINTMENT (OUTPATIENT)
Dept: OBGYN | Facility: CLINIC | Age: 34
End: 2024-10-08
Payer: COMMERCIAL

## 2024-10-08 VITALS
WEIGHT: 162 LBS | BODY MASS INDEX: 26.99 KG/M2 | HEIGHT: 65 IN | DIASTOLIC BLOOD PRESSURE: 72 MMHG | SYSTOLIC BLOOD PRESSURE: 111 MMHG | HEART RATE: 71 BPM

## 2024-10-08 DIAGNOSIS — O14.93 UNSPECIFIED PRE-ECLAMPSIA, THIRD TRIMESTER: ICD-10-CM

## 2024-10-08 DIAGNOSIS — O21.9 VOMITING OF PREGNANCY, UNSPECIFIED: ICD-10-CM

## 2024-10-08 DIAGNOSIS — N91.2 AMENORRHEA, UNSPECIFIED: ICD-10-CM

## 2024-10-08 PROCEDURE — 76817 TRANSVAGINAL US OBSTETRIC: CPT

## 2024-10-08 PROCEDURE — 99459 PELVIC EXAMINATION: CPT

## 2024-10-08 PROCEDURE — 99214 OFFICE O/P EST MOD 30 MIN: CPT

## 2024-10-08 RX ORDER — DOXYLAMINE SUCCINATE AND PYRIDOXINE HYDROCHLORIDE 10; 10 MG/1; MG/1
10-10 TABLET, DELAYED RELEASE ORAL
Qty: 120 | Refills: 1 | Status: ACTIVE | COMMUNITY
Start: 2024-10-08 | End: 1900-01-01

## 2024-10-09 PROBLEM — O14.93 PREECLAMPSIA, THIRD TRIMESTER: Status: RESOLVED | Noted: 2024-10-09 | Resolved: 2024-10-09

## 2024-10-09 LAB
ABO + RH PNL BLD: NORMAL
ALBUMIN SERPL ELPH-MCNC: 4.5 G/DL
ALP BLD-CCNC: 67 U/L
ALT SERPL-CCNC: 10 U/L
ANION GAP SERPL CALC-SCNC: 16 MMOL/L
AST SERPL-CCNC: 14 U/L
BILIRUB SERPL-MCNC: 0.2 MG/DL
BLD GP AB SCN SERPL QL: NORMAL
BUN SERPL-MCNC: 9 MG/DL
C TRACH RRNA SPEC QL NAA+PROBE: NOT DETECTED
CALCIUM SERPL-MCNC: 9.4 MG/DL
CHLORIDE SERPL-SCNC: 99 MMOL/L
CO2 SERPL-SCNC: 21 MMOL/L
CREAT SERPL-MCNC: 0.65 MG/DL
EGFR: 120 ML/MIN/1.73M2
ESTIMATED AVERAGE GLUCOSE: 100 MG/DL
GLUCOSE SERPL-MCNC: 47 MG/DL
HBA1C MFR BLD HPLC: 5.1 %
HBV SURFACE AG SER QL: NONREACTIVE
HCT VFR BLD CALC: 44.3 %
HCV AB SER QL: NONREACTIVE
HCV S/CO RATIO: 0.09 S/CO
HGB BLD-MCNC: 14.6 G/DL
HIV1+2 AB SPEC QL IA.RAPID: NONREACTIVE
MCHC RBC-ENTMCNC: 32.4 PG
MCHC RBC-ENTMCNC: 33 GM/DL
MCV RBC AUTO: 98.4 FL
N GONORRHOEA RRNA SPEC QL NAA+PROBE: NOT DETECTED
PLATELET # BLD AUTO: 265 K/UL
POTASSIUM SERPL-SCNC: 3.9 MMOL/L
PROT SERPL-MCNC: 7.1 G/DL
RBC # BLD: 4.5 M/UL
RBC # FLD: 13.2 %
RUBV IGG FLD-ACNC: 1.59 INDEX
RUBV IGG SER-IMP: POSITIVE
SODIUM SERPL-SCNC: 137 MMOL/L
SOURCE AMPLIFICATION: NORMAL
T GONDII AB SER-IMP: NEGATIVE
T GONDII IGM SER QL: <3 AU/ML
T PALLIDUM AB SER QL IA: NEGATIVE
TSH SERPL-ACNC: 0.45 UIU/ML
VZV AB TITR SER: POSITIVE
VZV IGG SER IF-ACNC: 6.39 S/CO
WBC # FLD AUTO: 10.98 K/UL

## 2024-10-10 ENCOUNTER — TRANSCRIPTION ENCOUNTER (OUTPATIENT)
Age: 34
End: 2024-10-10

## 2024-10-10 LAB
B19V IGG SER QL IA: 0.19 INDEX
B19V IGG+IGM SER-IMP: NEGATIVE
B19V IGG+IGM SER-IMP: NORMAL
B19V IGM FLD-ACNC: 0.19 INDEX
B19V IGM SER-ACNC: NEGATIVE
BACTERIA UR CULT: NORMAL
LEAD BLD-MCNC: <1 UG/DL

## 2024-10-21 ENCOUNTER — TRANSCRIPTION ENCOUNTER (OUTPATIENT)
Age: 34
End: 2024-10-21

## 2024-11-14 ENCOUNTER — NON-APPOINTMENT (OUTPATIENT)
Age: 34
End: 2024-11-14

## 2024-11-14 ENCOUNTER — APPOINTMENT (OUTPATIENT)
Dept: ANTEPARTUM | Facility: CLINIC | Age: 34
End: 2024-11-14
Payer: COMMERCIAL

## 2024-11-14 ENCOUNTER — APPOINTMENT (OUTPATIENT)
Dept: OBGYN | Facility: CLINIC | Age: 34
End: 2024-11-14

## 2024-11-14 ENCOUNTER — ASOB RESULT (OUTPATIENT)
Age: 34
End: 2024-11-14

## 2024-11-14 VITALS
SYSTOLIC BLOOD PRESSURE: 104 MMHG | WEIGHT: 158 LBS | HEART RATE: 67 BPM | BODY MASS INDEX: 26.33 KG/M2 | DIASTOLIC BLOOD PRESSURE: 68 MMHG | HEIGHT: 65 IN

## 2024-11-14 DIAGNOSIS — Z34.81 ENCOUNTER FOR SUPERVISION OF OTHER NORMAL PREGNANCY, FIRST TRIMESTER: ICD-10-CM

## 2024-11-14 PROCEDURE — 76813 OB US NUCHAL MEAS 1 GEST: CPT

## 2024-11-14 PROCEDURE — 90656 IIV3 VACC NO PRSV 0.5 ML IM: CPT

## 2024-11-14 PROCEDURE — G0008: CPT

## 2024-11-14 PROCEDURE — 76801 OB US < 14 WKS SINGLE FETUS: CPT

## 2024-11-14 PROCEDURE — 0502F SUBSEQUENT PRENATAL CARE: CPT | Mod: 25

## 2024-12-10 DIAGNOSIS — F32.1 MAJOR DEPRESSIVE DISORDER, SINGLE EPISODE, MODERATE: ICD-10-CM

## 2024-12-10 DIAGNOSIS — F32.A DEPRESSION, UNSPECIFIED: ICD-10-CM

## 2024-12-11 ENCOUNTER — NON-APPOINTMENT (OUTPATIENT)
Age: 34
End: 2024-12-11

## 2024-12-11 ENCOUNTER — APPOINTMENT (OUTPATIENT)
Dept: OBGYN | Facility: CLINIC | Age: 34
End: 2024-12-11

## 2024-12-11 VITALS
HEART RATE: 76 BPM | BODY MASS INDEX: 26.82 KG/M2 | SYSTOLIC BLOOD PRESSURE: 143 MMHG | WEIGHT: 161 LBS | HEIGHT: 65 IN | DIASTOLIC BLOOD PRESSURE: 72 MMHG

## 2024-12-11 VITALS — SYSTOLIC BLOOD PRESSURE: 106 MMHG | DIASTOLIC BLOOD PRESSURE: 72 MMHG

## 2024-12-11 DIAGNOSIS — Z34.82 ENCOUNTER FOR SUPERVISION OF OTHER NORMAL PREGNANCY, SECOND TRIMESTER: ICD-10-CM

## 2024-12-11 PROCEDURE — 0502F SUBSEQUENT PRENATAL CARE: CPT

## 2024-12-12 ENCOUNTER — TRANSCRIPTION ENCOUNTER (OUTPATIENT)
Age: 34
End: 2024-12-12

## 2024-12-12 LAB
AFP INTERP SERPL-IMP: NORMAL
AFP INTERP SERPL-IMP: NORMAL
AFP MOM CUT-OFF: 2.5
AFP MOM SERPL: 0.95
AFP PERCENTILE: 44.2
AFP SERPL-ACNC: 31.75 NG/ML
CARBAMAZEPINE?: NO
CURRENT SMOKER: NORMAL
DIABETES STATUS PATIENT: NORMAL
GA: NORMAL
GESTATIONAL AGE METHOD: NORMAL
HX OF NTD NARR: NORMAL
MULTIPLE PREGNANCY: NORMAL
NEURAL TUBE DEFECT RISK FETUS: NORMAL
NEURAL TUBE DEFECT RISK POP: NORMAL
RECOM F/U: NO
TEST PERFORMANCE INFO SPEC: NORMAL
VALPROIC ACID?: NORMAL

## 2024-12-18 RX ORDER — SERTRALINE 25 MG/1
25 TABLET, FILM COATED ORAL DAILY
Qty: 7 | Refills: 0 | Status: ACTIVE | COMMUNITY
Start: 2024-12-18 | End: 1900-01-01

## 2024-12-18 RX ORDER — SERTRALINE HYDROCHLORIDE 50 MG/1
50 TABLET, FILM COATED ORAL DAILY
Qty: 14 | Refills: 0 | Status: ACTIVE | COMMUNITY
Start: 2024-12-18 | End: 1900-01-01

## 2024-12-19 ENCOUNTER — TRANSCRIPTION ENCOUNTER (OUTPATIENT)
Age: 34
End: 2024-12-19

## 2025-01-06 ENCOUNTER — TRANSCRIPTION ENCOUNTER (OUTPATIENT)
Age: 35
End: 2025-01-06

## 2025-01-09 ENCOUNTER — APPOINTMENT (OUTPATIENT)
Dept: ANTEPARTUM | Facility: CLINIC | Age: 35
End: 2025-01-09
Payer: COMMERCIAL

## 2025-01-09 ENCOUNTER — ASOB RESULT (OUTPATIENT)
Age: 35
End: 2025-01-09

## 2025-01-09 ENCOUNTER — APPOINTMENT (OUTPATIENT)
Dept: OBGYN | Facility: CLINIC | Age: 35
End: 2025-01-09
Payer: COMMERCIAL

## 2025-01-09 VITALS
HEIGHT: 65 IN | DIASTOLIC BLOOD PRESSURE: 73 MMHG | BODY MASS INDEX: 27.32 KG/M2 | SYSTOLIC BLOOD PRESSURE: 110 MMHG | WEIGHT: 164 LBS | HEART RATE: 87 BPM

## 2025-01-09 PROCEDURE — 0502F SUBSEQUENT PRENATAL CARE: CPT

## 2025-01-09 PROCEDURE — 76811 OB US DETAILED SNGL FETUS: CPT

## 2025-02-04 ENCOUNTER — APPOINTMENT (OUTPATIENT)
Dept: OBGYN | Facility: CLINIC | Age: 35
End: 2025-02-04

## 2025-02-04 VITALS
HEIGHT: 65 IN | SYSTOLIC BLOOD PRESSURE: 116 MMHG | DIASTOLIC BLOOD PRESSURE: 74 MMHG | WEIGHT: 170 LBS | BODY MASS INDEX: 28.32 KG/M2 | HEART RATE: 89 BPM

## 2025-02-04 DIAGNOSIS — Z34.82 ENCOUNTER FOR SUPERVISION OF OTHER NORMAL PREGNANCY, SECOND TRIMESTER: ICD-10-CM

## 2025-02-04 PROCEDURE — 0502F SUBSEQUENT PRENATAL CARE: CPT

## 2025-02-24 ENCOUNTER — TRANSCRIPTION ENCOUNTER (OUTPATIENT)
Age: 35
End: 2025-02-24

## 2025-03-05 ENCOUNTER — NON-APPOINTMENT (OUTPATIENT)
Age: 35
End: 2025-03-05

## 2025-03-05 ENCOUNTER — APPOINTMENT (OUTPATIENT)
Dept: OBGYN | Facility: CLINIC | Age: 35
End: 2025-03-05

## 2025-03-05 VITALS
HEART RATE: 121 BPM | BODY MASS INDEX: 28.49 KG/M2 | SYSTOLIC BLOOD PRESSURE: 123 MMHG | DIASTOLIC BLOOD PRESSURE: 78 MMHG | TEMPERATURE: 100.1 F | WEIGHT: 171 LBS | HEIGHT: 65 IN

## 2025-03-05 DIAGNOSIS — Z34.83 ENCOUNTER FOR SUPERVISION OF OTHER NORMAL PREGNANCY, THIRD TRIMESTER: ICD-10-CM

## 2025-03-05 DIAGNOSIS — J11.1 INFLUENZA DUE TO UNIDENTIFIED INFLUENZA VIRUS WITH OTHER RESPIRATORY MANIFESTATIONS: ICD-10-CM

## 2025-03-05 PROCEDURE — 0502F SUBSEQUENT PRENATAL CARE: CPT

## 2025-03-05 RX ORDER — OSELTAMIVIR PHOSPHATE 75 MG/1
75 CAPSULE ORAL TWICE DAILY
Qty: 1 | Refills: 0 | Status: ACTIVE | COMMUNITY
Start: 2025-03-05 | End: 1900-01-01

## 2025-03-05 RX ADMIN — ACETAMINOPHEN 2 MG: 500 TABLET, COATED ORAL at 00:00

## 2025-03-06 LAB
ALBUMIN SERPL ELPH-MCNC: 3.8 G/DL
ALP BLD-CCNC: 100 U/L
ALT SERPL-CCNC: 15 U/L
ANION GAP SERPL CALC-SCNC: 14 MMOL/L
AST SERPL-CCNC: 13 U/L
BILIRUB SERPL-MCNC: 0.6 MG/DL
BUN SERPL-MCNC: 5 MG/DL
CALCIUM SERPL-MCNC: 9 MG/DL
CHLORIDE SERPL-SCNC: 101 MMOL/L
CO2 SERPL-SCNC: 21 MMOL/L
CREAT SERPL-MCNC: 0.69 MG/DL
EGFRCR SERPLBLD CKD-EPI 2021: 118 ML/MIN/1.73M2
HCT VFR BLD CALC: 42 %
HGB BLD-MCNC: 13.6 G/DL
MCHC RBC-ENTMCNC: 32.3 PG
MCHC RBC-ENTMCNC: 32.4 G/DL
MCV RBC AUTO: 99.8 FL
PLATELET # BLD AUTO: 230 K/UL
POTASSIUM SERPL-SCNC: 3.8 MMOL/L
PROT SERPL-MCNC: 6.4 G/DL
RBC # BLD: 4.21 M/UL
RBC # FLD: 14.1 %
SODIUM SERPL-SCNC: 136 MMOL/L
T PALLIDUM AB SER QL IA: NEGATIVE
WBC # FLD AUTO: 10.89 K/UL

## 2025-03-11 ENCOUNTER — NON-APPOINTMENT (OUTPATIENT)
Age: 35
End: 2025-03-11

## 2025-03-11 ENCOUNTER — TRANSCRIPTION ENCOUNTER (OUTPATIENT)
Age: 35
End: 2025-03-11

## 2025-03-11 ENCOUNTER — APPOINTMENT (OUTPATIENT)
Dept: OBGYN | Facility: CLINIC | Age: 35
End: 2025-03-11

## 2025-03-20 ENCOUNTER — ASOB RESULT (OUTPATIENT)
Age: 35
End: 2025-03-20

## 2025-03-20 ENCOUNTER — APPOINTMENT (OUTPATIENT)
Dept: ANTEPARTUM | Facility: CLINIC | Age: 35
End: 2025-03-20
Payer: COMMERCIAL

## 2025-03-20 PROCEDURE — 76816 OB US FOLLOW-UP PER FETUS: CPT

## 2025-03-20 PROCEDURE — 76819 FETAL BIOPHYS PROFIL W/O NST: CPT | Mod: 59

## 2025-03-27 ENCOUNTER — NON-APPOINTMENT (OUTPATIENT)
Age: 35
End: 2025-03-27

## 2025-03-27 ENCOUNTER — APPOINTMENT (OUTPATIENT)
Dept: OBGYN | Facility: CLINIC | Age: 35
End: 2025-03-27

## 2025-03-27 VITALS
BODY MASS INDEX: 29.16 KG/M2 | WEIGHT: 175 LBS | DIASTOLIC BLOOD PRESSURE: 80 MMHG | HEART RATE: 82 BPM | SYSTOLIC BLOOD PRESSURE: 125 MMHG | HEIGHT: 65 IN

## 2025-03-27 DIAGNOSIS — Z34.83 ENCOUNTER FOR SUPERVISION OF OTHER NORMAL PREGNANCY, THIRD TRIMESTER: ICD-10-CM

## 2025-03-27 PROCEDURE — 0502F SUBSEQUENT PRENATAL CARE: CPT

## 2025-04-05 LAB — GLUCOSE 1H P 50 G GLC PO SERPL-MCNC: 104 MG/DL

## 2025-04-15 ENCOUNTER — APPOINTMENT (OUTPATIENT)
Dept: OBGYN | Facility: CLINIC | Age: 35
End: 2025-04-15

## 2025-04-15 VITALS
HEIGHT: 65 IN | HEART RATE: 82 BPM | SYSTOLIC BLOOD PRESSURE: 120 MMHG | WEIGHT: 177 LBS | DIASTOLIC BLOOD PRESSURE: 76 MMHG | BODY MASS INDEX: 29.49 KG/M2

## 2025-04-15 DIAGNOSIS — Z34.83 ENCOUNTER FOR SUPERVISION OF OTHER NORMAL PREGNANCY, THIRD TRIMESTER: ICD-10-CM

## 2025-04-15 PROCEDURE — 0502F SUBSEQUENT PRENATAL CARE: CPT

## 2025-04-24 ENCOUNTER — ASOB RESULT (OUTPATIENT)
Age: 35
End: 2025-04-24

## 2025-04-24 ENCOUNTER — APPOINTMENT (OUTPATIENT)
Dept: ANTEPARTUM | Facility: CLINIC | Age: 35
End: 2025-04-24
Payer: COMMERCIAL

## 2025-04-24 PROCEDURE — 76816 OB US FOLLOW-UP PER FETUS: CPT

## 2025-04-24 PROCEDURE — 76819 FETAL BIOPHYS PROFIL W/O NST: CPT

## 2025-04-29 ENCOUNTER — APPOINTMENT (OUTPATIENT)
Dept: OBGYN | Facility: CLINIC | Age: 35
End: 2025-04-29

## 2025-04-29 VITALS
HEART RATE: 77 BPM | HEIGHT: 65 IN | SYSTOLIC BLOOD PRESSURE: 118 MMHG | WEIGHT: 178 LBS | BODY MASS INDEX: 29.66 KG/M2 | DIASTOLIC BLOOD PRESSURE: 78 MMHG

## 2025-04-29 PROCEDURE — 0502F SUBSEQUENT PRENATAL CARE: CPT

## 2025-04-30 ENCOUNTER — APPOINTMENT (OUTPATIENT)
Dept: OBGYN | Facility: CLINIC | Age: 35
End: 2025-04-30

## 2025-05-01 LAB
GP B STREP DNA SPEC QL NAA+PROBE: DETECTED
HIV1+2 AB SPEC QL IA.RAPID: NONREACTIVE
SOURCE GBS: NORMAL
T PALLIDUM AB SER QL IA: NEGATIVE

## 2025-05-06 ENCOUNTER — APPOINTMENT (OUTPATIENT)
Dept: OBGYN | Facility: CLINIC | Age: 35
End: 2025-05-06

## 2025-05-06 VITALS
BODY MASS INDEX: 29.82 KG/M2 | HEIGHT: 65 IN | DIASTOLIC BLOOD PRESSURE: 79 MMHG | WEIGHT: 179 LBS | SYSTOLIC BLOOD PRESSURE: 119 MMHG | HEART RATE: 88 BPM

## 2025-05-06 PROCEDURE — 0502F SUBSEQUENT PRENATAL CARE: CPT

## 2025-05-15 ENCOUNTER — APPOINTMENT (OUTPATIENT)
Dept: OBGYN | Facility: CLINIC | Age: 35
End: 2025-05-15

## 2025-05-15 VITALS
SYSTOLIC BLOOD PRESSURE: 127 MMHG | WEIGHT: 182 LBS | BODY MASS INDEX: 30.32 KG/M2 | HEIGHT: 65 IN | DIASTOLIC BLOOD PRESSURE: 83 MMHG | HEART RATE: 72 BPM

## 2025-05-15 PROCEDURE — 0502F SUBSEQUENT PRENATAL CARE: CPT

## 2025-05-20 ENCOUNTER — APPOINTMENT (OUTPATIENT)
Dept: OBGYN | Facility: CLINIC | Age: 35
End: 2025-05-20

## 2025-05-20 VITALS
SYSTOLIC BLOOD PRESSURE: 132 MMHG | HEIGHT: 65 IN | DIASTOLIC BLOOD PRESSURE: 76 MMHG | HEART RATE: 87 BPM | WEIGHT: 181 LBS | BODY MASS INDEX: 30.16 KG/M2

## 2025-05-20 PROCEDURE — 0502F SUBSEQUENT PRENATAL CARE: CPT

## 2025-05-28 ENCOUNTER — ASOB RESULT (OUTPATIENT)
Age: 35
End: 2025-05-28

## 2025-05-28 ENCOUNTER — APPOINTMENT (OUTPATIENT)
Dept: ANTEPARTUM | Facility: CLINIC | Age: 35
End: 2025-05-28
Payer: COMMERCIAL

## 2025-05-28 PROCEDURE — 76816 OB US FOLLOW-UP PER FETUS: CPT

## 2025-05-28 PROCEDURE — 76818 FETAL BIOPHYS PROFILE W/NST: CPT

## 2025-05-29 ENCOUNTER — APPOINTMENT (OUTPATIENT)
Dept: OBGYN | Facility: CLINIC | Age: 35
End: 2025-05-29
Payer: COMMERCIAL

## 2025-05-29 VITALS
HEIGHT: 65 IN | WEIGHT: 186 LBS | DIASTOLIC BLOOD PRESSURE: 76 MMHG | HEART RATE: 80 BPM | BODY MASS INDEX: 30.99 KG/M2 | SYSTOLIC BLOOD PRESSURE: 123 MMHG

## 2025-05-29 DIAGNOSIS — Z34.83 ENCOUNTER FOR SUPERVISION OF OTHER NORMAL PREGNANCY, THIRD TRIMESTER: ICD-10-CM

## 2025-05-29 PROCEDURE — 0502F SUBSEQUENT PRENATAL CARE: CPT

## 2025-06-02 ENCOUNTER — TRANSCRIPTION ENCOUNTER (OUTPATIENT)
Age: 35
End: 2025-06-02

## 2025-06-02 ENCOUNTER — INPATIENT (INPATIENT)
Facility: HOSPITAL | Age: 35
LOS: 0 days | Discharge: HOME | End: 2025-06-03
Attending: OBSTETRICS & GYNECOLOGY | Admitting: OBSTETRICS & GYNECOLOGY
Payer: COMMERCIAL

## 2025-06-02 VITALS — HEART RATE: 78 BPM | DIASTOLIC BLOOD PRESSURE: 73 MMHG | SYSTOLIC BLOOD PRESSURE: 118 MMHG

## 2025-06-02 DIAGNOSIS — O41.00X0 OLIGOHYDRAMNIOS, UNSPECIFIED TRIMESTER, NOT APPLICABLE OR UNSPECIFIED: ICD-10-CM

## 2025-06-02 LAB
BASOPHILS # BLD AUTO: 0.02 K/UL — SIGNIFICANT CHANGE UP (ref 0–0.2)
BASOPHILS NFR BLD AUTO: 0.2 % — SIGNIFICANT CHANGE UP (ref 0–2)
BLD GP AB SCN SERPL QL: NEGATIVE — SIGNIFICANT CHANGE UP
EOSINOPHIL # BLD AUTO: 0.07 K/UL — SIGNIFICANT CHANGE UP (ref 0–0.5)
EOSINOPHIL NFR BLD AUTO: 0.8 % — SIGNIFICANT CHANGE UP (ref 0–6)
HCT VFR BLD CALC: 37.1 % — SIGNIFICANT CHANGE UP (ref 34.5–45)
HGB BLD-MCNC: 12.5 G/DL — SIGNIFICANT CHANGE UP (ref 11.5–15.5)
IANC: 5.51 K/UL — SIGNIFICANT CHANGE UP (ref 1.8–7.4)
IMM GRANULOCYTES NFR BLD AUTO: 1.2 % — HIGH (ref 0–0.9)
LYMPHOCYTES # BLD AUTO: 2.64 K/UL — SIGNIFICANT CHANGE UP (ref 1–3.3)
LYMPHOCYTES # BLD AUTO: 28.8 % — SIGNIFICANT CHANGE UP (ref 13–44)
MCHC RBC-ENTMCNC: 31.3 PG — SIGNIFICANT CHANGE UP (ref 27–34)
MCHC RBC-ENTMCNC: 33.7 G/DL — SIGNIFICANT CHANGE UP (ref 32–36)
MCV RBC AUTO: 92.8 FL — SIGNIFICANT CHANGE UP (ref 80–100)
MONOCYTES # BLD AUTO: 0.83 K/UL — SIGNIFICANT CHANGE UP (ref 0–0.9)
MONOCYTES NFR BLD AUTO: 9 % — SIGNIFICANT CHANGE UP (ref 2–14)
NEUTROPHILS # BLD AUTO: 5.51 K/UL — SIGNIFICANT CHANGE UP (ref 1.8–7.4)
NEUTROPHILS NFR BLD AUTO: 60 % — SIGNIFICANT CHANGE UP (ref 43–77)
NRBC # BLD AUTO: 0 K/UL — SIGNIFICANT CHANGE UP (ref 0–0)
NRBC # FLD: 0 K/UL — SIGNIFICANT CHANGE UP (ref 0–0)
NRBC BLD AUTO-RTO: 0 /100 WBCS — SIGNIFICANT CHANGE UP (ref 0–0)
PLATELET # BLD AUTO: 225 K/UL — SIGNIFICANT CHANGE UP (ref 150–400)
RBC # BLD: 4 M/UL — SIGNIFICANT CHANGE UP (ref 3.8–5.2)
RBC # FLD: 14.6 % — HIGH (ref 10.3–14.5)
RH IG SCN BLD-IMP: POSITIVE — SIGNIFICANT CHANGE UP
RH IG SCN BLD-IMP: POSITIVE — SIGNIFICANT CHANGE UP
WBC # BLD: 9.18 K/UL — SIGNIFICANT CHANGE UP (ref 3.8–10.5)
WBC # FLD AUTO: 9.18 K/UL — SIGNIFICANT CHANGE UP (ref 3.8–10.5)

## 2025-06-02 PROCEDURE — 59400 OBSTETRICAL CARE: CPT | Mod: GC

## 2025-06-02 RX ORDER — BENZOCAINE 220 MG/G
1 SPRAY, METERED PERIODONTAL EVERY 6 HOURS
Refills: 0 | Status: DISCONTINUED | OUTPATIENT
Start: 2025-06-02 | End: 2025-06-03

## 2025-06-02 RX ORDER — SIMETHICONE 80 MG
80 TABLET,CHEWABLE ORAL EVERY 4 HOURS
Refills: 0 | Status: DISCONTINUED | OUTPATIENT
Start: 2025-06-02 | End: 2025-06-03

## 2025-06-02 RX ORDER — MAGNESIUM HYDROXIDE 400 MG/5ML
30 SUSPENSION ORAL
Refills: 0 | Status: DISCONTINUED | OUTPATIENT
Start: 2025-06-02 | End: 2025-06-03

## 2025-06-02 RX ORDER — MODIFIED LANOLIN 100 %
1 CREAM (GRAM) TOPICAL EVERY 6 HOURS
Refills: 0 | Status: DISCONTINUED | OUTPATIENT
Start: 2025-06-02 | End: 2025-06-03

## 2025-06-02 RX ORDER — OXYTOCIN-SODIUM CHLORIDE 0.9% IV SOLN 30 UNIT/500ML 30-0.9/5 UT/ML-%
167 SOLUTION INTRAVENOUS
Qty: 30 | Refills: 0 | Status: DISCONTINUED | OUTPATIENT
Start: 2025-06-02 | End: 2025-06-02

## 2025-06-02 RX ORDER — WITCH HAZEL LEAF
1 FLUID EXTRACT MISCELLANEOUS EVERY 4 HOURS
Refills: 0 | Status: DISCONTINUED | OUTPATIENT
Start: 2025-06-02 | End: 2025-06-03

## 2025-06-02 RX ORDER — DIBUCAINE 10 MG/G
1 OINTMENT TOPICAL EVERY 6 HOURS
Refills: 0 | Status: DISCONTINUED | OUTPATIENT
Start: 2025-06-02 | End: 2025-06-03

## 2025-06-02 RX ORDER — CITRIC ACID/SODIUM CITRATE 300-500 MG
15 SOLUTION, ORAL ORAL EVERY 6 HOURS
Refills: 0 | Status: DISCONTINUED | OUTPATIENT
Start: 2025-06-02 | End: 2025-06-02

## 2025-06-02 RX ORDER — PRENATAL 136/IRON/FOLIC ACID 27 MG-1 MG
1 TABLET ORAL DAILY
Refills: 0 | Status: DISCONTINUED | OUTPATIENT
Start: 2025-06-02 | End: 2025-06-03

## 2025-06-02 RX ORDER — ACETAMINOPHEN 500 MG/5ML
975 LIQUID (ML) ORAL
Refills: 0 | Status: DISCONTINUED | OUTPATIENT
Start: 2025-06-02 | End: 2025-06-03

## 2025-06-02 RX ORDER — SODIUM CHLORIDE 9 G/1000ML
1000 INJECTION, SOLUTION INTRAVENOUS
Refills: 0 | Status: DISCONTINUED | OUTPATIENT
Start: 2025-06-02 | End: 2025-06-02

## 2025-06-02 RX ORDER — OXYTOCIN-SODIUM CHLORIDE 0.9% IV SOLN 30 UNIT/500ML 30-0.9/5 UT/ML-%
167 SOLUTION INTRAVENOUS
Qty: 30 | Refills: 0 | Status: DISCONTINUED | OUTPATIENT
Start: 2025-06-02 | End: 2025-06-03

## 2025-06-02 RX ORDER — AMPICILLIN SODIUM 1 G/1
2 INJECTION, POWDER, FOR SOLUTION INTRAMUSCULAR; INTRAVENOUS ONCE
Refills: 0 | Status: COMPLETED | OUTPATIENT
Start: 2025-06-02 | End: 2025-06-02

## 2025-06-02 RX ORDER — KETOROLAC TROMETHAMINE 30 MG/ML
30 INJECTION, SOLUTION INTRAMUSCULAR; INTRAVENOUS ONCE
Refills: 0 | Status: DISCONTINUED | OUTPATIENT
Start: 2025-06-02 | End: 2025-06-02

## 2025-06-02 RX ORDER — PRAMOXINE HCL 1 %
1 GEL (GRAM) TOPICAL EVERY 4 HOURS
Refills: 0 | Status: DISCONTINUED | OUTPATIENT
Start: 2025-06-02 | End: 2025-06-03

## 2025-06-02 RX ORDER — IBUPROFEN 200 MG
600 TABLET ORAL EVERY 6 HOURS
Refills: 0 | Status: DISCONTINUED | OUTPATIENT
Start: 2025-06-02 | End: 2025-06-03

## 2025-06-02 RX ORDER — IBUPROFEN 200 MG
600 TABLET ORAL EVERY 6 HOURS
Refills: 0 | Status: COMPLETED | OUTPATIENT
Start: 2025-06-02 | End: 2026-05-01

## 2025-06-02 RX ORDER — SENNA 187 MG
1 TABLET ORAL
Refills: 0 | Status: DISCONTINUED | OUTPATIENT
Start: 2025-06-02 | End: 2025-06-03

## 2025-06-02 RX ORDER — OXYTOCIN-SODIUM CHLORIDE 0.9% IV SOLN 30 UNIT/500ML 30-0.9/5 UT/ML-%
2 SOLUTION INTRAVENOUS
Qty: 30 | Refills: 0 | Status: DISCONTINUED | OUTPATIENT
Start: 2025-06-02 | End: 2025-06-02

## 2025-06-02 RX ORDER — HYDROCORTISONE 10 MG/G
1 CREAM TOPICAL EVERY 6 HOURS
Refills: 0 | Status: DISCONTINUED | OUTPATIENT
Start: 2025-06-02 | End: 2025-06-03

## 2025-06-02 RX ORDER — AMPICILLIN SODIUM 1 G/1
1 INJECTION, POWDER, FOR SOLUTION INTRAMUSCULAR; INTRAVENOUS EVERY 4 HOURS
Refills: 0 | Status: DISCONTINUED | OUTPATIENT
Start: 2025-06-02 | End: 2025-06-02

## 2025-06-02 RX ORDER — DIPHENHYDRAMINE HCL 12.5MG/5ML
25 ELIXIR ORAL EVERY 6 HOURS
Refills: 0 | Status: DISCONTINUED | OUTPATIENT
Start: 2025-06-02 | End: 2025-06-03

## 2025-06-02 RX ADMIN — KETOROLAC TROMETHAMINE 30 MILLIGRAM(S): 30 INJECTION, SOLUTION INTRAMUSCULAR; INTRAVENOUS at 12:58

## 2025-06-02 RX ADMIN — AMPICILLIN SODIUM 200 GRAM(S): 1 INJECTION, POWDER, FOR SOLUTION INTRAMUSCULAR; INTRAVENOUS at 03:41

## 2025-06-02 RX ADMIN — Medication 1 TABLET(S): at 20:37

## 2025-06-02 RX ADMIN — OXYTOCIN-SODIUM CHLORIDE 0.9% IV SOLN 30 UNIT/500ML 2 MILLIUNIT(S)/MIN: 30-0.9/5 SOLUTION at 07:55

## 2025-06-02 RX ADMIN — Medication 3 MILLILITER(S): at 15:00

## 2025-06-02 RX ADMIN — KETOROLAC TROMETHAMINE 30 MILLIGRAM(S): 30 INJECTION, SOLUTION INTRAMUSCULAR; INTRAVENOUS at 12:39

## 2025-06-02 RX ADMIN — Medication 3 MILLILITER(S): at 22:00

## 2025-06-02 RX ADMIN — SODIUM CHLORIDE 125 MILLILITER(S): 9 INJECTION, SOLUTION INTRAVENOUS at 03:40

## 2025-06-02 RX ADMIN — SODIUM CHLORIDE 125 MILLILITER(S): 9 INJECTION, SOLUTION INTRAVENOUS at 07:55

## 2025-06-02 RX ADMIN — Medication 600 MILLIGRAM(S): at 19:03

## 2025-06-02 RX ADMIN — OXYTOCIN-SODIUM CHLORIDE 0.9% IV SOLN 30 UNIT/500ML 2 MILLIUNIT(S)/MIN: 30-0.9/5 SOLUTION at 04:14

## 2025-06-02 RX ADMIN — AMPICILLIN SODIUM 100 GRAM(S): 1 INJECTION, POWDER, FOR SOLUTION INTRAMUSCULAR; INTRAVENOUS at 07:55

## 2025-06-02 RX ADMIN — Medication 1 APPLICATION(S): at 04:14

## 2025-06-02 NOTE — DISCHARGE NOTE OB - MEDICATION SUMMARY - MEDICATIONS TO TAKE
I will START or STAY ON the medications listed below when I get home from the hospital:  None I will START or STAY ON the medications listed below when I get home from the hospital:    ibuprofen 600 mg oral tablet  -- 1 tab(s) by mouth every 6 hours  -- Indication: For Pain   I will START or STAY ON the medications listed below when I get home from the hospital:    ibuprofen 600 mg oral tablet  -- 1 tab(s) by mouth every 6 hours as needed for  moderate pain  -- Indication: For Moderate pain    Tylenol 325 mg oral tablet  -- 3 tab(s) by mouth every 6 hours as needed for  mild pain  -- Indication: For Mild pain

## 2025-06-02 NOTE — DISCHARGE NOTE OB - FINANCIAL ASSISTANCE
[facility] provides services at a reduced cost to those who are determined to be eligible through [facility]’s financial assistance program. Information regarding [facility]’s financial assistance program can be found by going to [email or web address] or by calling [phone].

## 2025-06-02 NOTE — OB PROVIDER H&P - ASSESSMENT
A&P: 35 year old  at 41+1 presents for LT IOL. No complaints  Labor: admit to L&D  -pit  -routine labs  -EFM/toco  -NPO, IV hydration  Fetal: cat 1 tracing, fetal status reassuring  GBS: pos  Analgesia: PRN      d/w Dr Venessa Ventura PGY4

## 2025-06-02 NOTE — DISCHARGE NOTE OB - CARE PLAN
Principal Discharge DX:	Normal vaginal delivery  Assessment and plan of treatment:	Nothing per vagina for 6wks   1 Principal Discharge DX:	Normal vaginal delivery  Assessment and plan of treatment:	After discharge, please stay on pelvic rest for 6 weeks, meaning no sexual intercourse, no tampons and no douching.  No driving for 2 weeks.  No lifting objects heavier than baby for 2 weeks.  Expect to have vaginal bleeding/spotting for up to six weeks.  The bleeding should get lighter and more white/light brown with time.  For bleeding soaking more than a pad an hour or passing clots greater than the size of your fist, come in to the emergency department.  Follow up in the office in 6 weeks

## 2025-06-02 NOTE — OB RN DELIVERY SUMMARY - NS_SEPSISRSKCALC_OBGYN_ALL_OB_FT
EOS calculated successfully. EOS Risk Factor: 0.5/1000 live births ([facility] national incidence); GA=41w1d; Temp=98.06; ROM=2.383; GBS='Positive'; Antibiotics='GBS specific antibiotics > 2 hrs prior to birth'

## 2025-06-02 NOTE — OB PROVIDER H&P - NSLOWPPHRISK_OBGYN_A_OB
No previous uterine incision/[place] Pregnancy/Less than or equal to 4 previous vaginal births/No known bleeding disorder

## 2025-06-02 NOTE — DISCHARGE NOTE OB - PLAN OF CARE
Nothing per vagina for 6wks After discharge, please stay on pelvic rest for 6 weeks, meaning no sexual intercourse, no tampons and no douching.  No driving for 2 weeks.  No lifting objects heavier than baby for 2 weeks.  Expect to have vaginal bleeding/spotting for up to six weeks.  The bleeding should get lighter and more white/light brown with time.  For bleeding soaking more than a pad an hour or passing clots greater than the size of your fist, come in to the emergency department.  Follow up in the office in 6 weeks

## 2025-06-02 NOTE — OB RN DELIVERY SUMMARY - NSSELHIDDEN_OBGYN_ALL_OB_FT
[NS_DeliveryAttending1_OBGYN_ALL_OB_FT:[id number]==],[NS_DeliveryRN_OBGYN_ALL_OB_FT:[id number]=]

## 2025-06-02 NOTE — DISCHARGE NOTE OB - NS MD DC FALL RISK RISK
For information on Fall & Injury Prevention, visit: [email or web address] OR  [email or web address] OR  https://www.cdc.gov/steadi/patient.html

## 2025-06-02 NOTE — OB PROVIDER H&P - NS_OBGYNHISTORY_OBGYN_ALL_OB_FT
x 1 Protopic Counseling: Patient may experience a mild burning sensation during topical application. Protopic is not approved in children less than 2 years of age. There have been case reports of hematologic and skin malignancies in patients using topical calcineurin inhibitors although causality is questionable.

## 2025-06-02 NOTE — DISCHARGE NOTE OB - PATIENT PORTAL LINK FT
You can access the FollowMyHealth Patient Portal offered by [facility] by registering at the following website: [email or web address]. By joining [employer]’s FollowMyHealth portal, you will also be able to view your health information using other applications (apps) compatible with our system.

## 2025-06-02 NOTE — OB RN PATIENT PROFILE - FALL HARM RISK - UNIVERSAL INTERVENTIONS
Bed in lowest position, wheels locked, appropriate side rails in place/Call bell, personal items and telephone in reach/Instruct patient to call for assistance before getting out of bed or chair/Non-slip footwear when patient is out of bed/[place] to call system/Physically safe environment - no spills, clutter or unnecessary equipment/Purposeful Proactive Rounding/Room/bathroom lighting operational, light cord in reach

## 2025-06-02 NOTE — DISCHARGE NOTE OB - CARE PROVIDER_API CALL
Soledad Fuentes  Obstetrics and Gynecology  [address] [place] [address]  Phone: [phone]  Fax: [phone]  Follow Up Time:

## 2025-06-02 NOTE — OB PROVIDER H&P - NSHPPHYSICALEXAM_GEN_ALL_CORE
VS:  Vital Signs Last 24 Hrs  T(C): --  T(F): --  HR: 78 (02 Jun 2025 02:32) (78 - 78)  BP: 118/73 (02 Jun 2025 02:32) (118/73 - 118/73)  BP(mean): --  RR: --  SpO2: --      GA: NAD  Cards: RRR  Pulm: CTAB  EFH: reactive and reassuring  [place]: irregualr  VE: 3/70/-3  TAUS: vertex

## 2025-06-02 NOTE — OB PROVIDER DELIVERY SUMMARY - NSSELHIDDEN_OBGYN_ALL_OB_FT
[NS_DeliveryAttending1_OBGYN_ALL_OB_FT:[id number]==],[NS_DeliveryRN_OBGYN_ALL_OB_FT:[id number]=]

## 2025-06-02 NOTE — OB PROVIDER LABOR PROGRESS NOTE - ASSESSMENT
34yo  @41+1 LT IOL    - SVE: /-2  - Forebag palpated, will AROM after anethesia reinforcement  - FHT Cat 2, will reposition, previously Cat 1  - Anticipate     d/w Dr. Alfredo Hickey, PGY4

## 2025-06-02 NOTE — OB RN PATIENT PROFILE - THE METHOD OF FEEDING WHEN THE NEWBORN REQUESTS AND CONTINUING EACH FEEDING SESSION UNTIL THE NEWBORN IS SATISFIED
[facility] outreach for diabetes, ALESHA and FANI; daughter was able to take call and confirmed they are currently having insurance issues. She confirmed once resolved, she'd call back for scheduling and for labs.   
Statement Selected

## 2025-06-02 NOTE — OB PROVIDER H&P - HISTORY OF PRESENT ILLNESS
R4 H&P    Pt is a 34y/o  at 41+1 admitted for LT IOL. Endorses some leakage of fluid. Denies VB, Cx. Endorses good FM  Prenatal course uncomplicated  GBS pos  EFW 3500    OBHx:    8#10  SAB x 2  GynHx: Denies cysts, fibroids, abnormal paps, STIs  PMHx: Denies  PSHx: Denies  Med: PNV, ASA  All: NKDA  SH: Denies toxic habits x 3

## 2025-06-02 NOTE — OB PROVIDER LABOR PROGRESS NOTE - NS_SUBJECTIVE/OBJECTIVE_OBGYN_ALL_OB_FT
Patient reports increased rectal pressure.    T(C): 36.7 (06-02-25 @ 08:30), Max: 36.7 (06-02-25 @ 06:25)  HR: 87 (06-02-25 @ 10:24) (72 - 96)  BP: 108/64 (06-02-25 @ 09:46) (105/70 - 128/90)  RR: 16 (06-02-25 @ 08:30) (16 - 18)  SpO2: 91% (06-02-25 @ 10:20) (91% - 100%)

## 2025-06-03 VITALS
HEART RATE: 75 BPM | DIASTOLIC BLOOD PRESSURE: 70 MMHG | OXYGEN SATURATION: 100 % | TEMPERATURE: 99 F | SYSTOLIC BLOOD PRESSURE: 120 MMHG | RESPIRATION RATE: 18 BRPM

## 2025-06-03 LAB — T PALLIDUM AB TITR SER: NEGATIVE — SIGNIFICANT CHANGE UP

## 2025-06-03 RX ORDER — IBUPROFEN 200 MG
1 TABLET ORAL
Qty: 0 | Refills: 0 | DISCHARGE
Start: 2025-06-03

## 2025-06-03 RX ORDER — ACETAMINOPHEN 500 MG/5ML
3 LIQUID (ML) ORAL
Qty: 0 | Refills: 0 | DISCHARGE
Start: 2025-06-03

## 2025-06-03 RX ADMIN — Medication 975 MILLIGRAM(S): at 02:44

## 2025-06-03 RX ADMIN — Medication 975 MILLIGRAM(S): at 09:13

## 2025-06-03 RX ADMIN — Medication 600 MILLIGRAM(S): at 06:43

## 2025-06-03 RX ADMIN — Medication 600 MILLIGRAM(S): at 17:38

## 2025-06-03 RX ADMIN — Medication 975 MILLIGRAM(S): at 14:51

## 2025-06-03 RX ADMIN — Medication 600 MILLIGRAM(S): at 00:55

## 2025-06-03 RX ADMIN — Medication 3 MILLILITER(S): at 06:00

## 2025-06-03 RX ADMIN — Medication 975 MILLIGRAM(S): at 03:15

## 2025-06-03 RX ADMIN — Medication 600 MILLIGRAM(S): at 12:40

## 2025-06-03 RX ADMIN — Medication 1 TABLET(S): at 17:38

## 2025-06-03 RX ADMIN — Medication 600 MILLIGRAM(S): at 06:16

## 2025-06-03 RX ADMIN — Medication 600 MILLIGRAM(S): at 00:28

## 2025-06-03 RX ADMIN — Medication 1 TABLET(S): at 12:11

## 2025-06-03 RX ADMIN — Medication 975 MILLIGRAM(S): at 09:50

## 2025-06-03 RX ADMIN — Medication 975 MILLIGRAM(S): at 15:30

## 2025-06-03 RX ADMIN — Medication 600 MILLIGRAM(S): at 12:11

## 2025-06-03 RX ADMIN — Medication 600 MILLIGRAM(S): at 17:55

## 2025-06-03 NOTE — PROGRESS NOTE ADULT - SUBJECTIVE AND OBJECTIVE BOX
Patient seen and examined at bedside, resting comfortably in no acute distress. Denies fever, chills, nausea or vomiting. She is tolerating a regular diet. She is ambulating without difficulty and voiding spontaneously. Pain is well controlled. Bleeding is less than a normal menses.    Physical Examination:  Vital Signs: Vital Signs Last 24 Hrs  T(C): 36.6 (2025 06:33), Max: 36.9 (2025 21:24)  T(F): 97.8 (2025 06:33), Max: 98.4 (2025 21:24)  HR: 81 (2025 06:33) (72 - 121)  BP: 124/79 (2025 06:33) (105/70 - 132/73)  BP(mean): --  RR: 18 (2025 06:33) (16 - 19)  SpO2: 100% (2025 06:33) (79% - 100%)    Parameters below as of 2025 06:33  Patient On (Oxygen Delivery Method): room air      General: alert and oriented, no acute distress  Cardio: regular rate and rhythm  Respiratory: non labored respirations  Abdomen: soft, non-tender, non-distended, uterus firm, palpated below the umbilicus  VE: minimal lochia noted  Musculoskeletal: No erythema/edema, no calf tenderness bilaterally    MEDICATIONS  (STANDING):  acetaminophen     Tablet .. 975 milliGRAM(s) Oral <User Schedule>  diphtheria/tetanus/pertussis (acellular) Vaccine (Adacel) 0.5 milliLiter(s) IntraMuscular once  ibuprofen  Tablet. 600 milliGRAM(s) Oral every 6 hours  oxytocin Infusion 167 milliUNIT(s)/Min (167 mL/Hr) IV Continuous <Continuous>  prenatal multivitamin 1 Tablet(s) Oral daily  senna 1 Tablet(s) Oral two times a day  sodium chloride 0.9% lock flush 3 milliLiter(s) IV Push every 8 hours      Labs:  Blood type: O Positive  Rubella IgG: RPR: Negative                          12.5   9.18 >-----------< 225    (  @ 02:30 )             37.1    Assessment and Plan:   34yo s/p  on  now PPD#1 .  Patient is stable and doing well post-partum.   Blood type: O+    Plan:  - VS per unit protocol  - SCDs for DVT ppx  - Regular diet  - Pain well controlled, continue current pain regimen  - Encourage ambulation  - Discharge instructions reviewed  - D/c planning initiated, patient to follow up in office in 6 weeks for post partum visit    Andrez Sharp MD

## 2025-06-03 NOTE — LACTATION INITIAL EVALUATION - LACTATION INTERVENTIONS
initiate/review safe skin-to-skin/initiate/review hand expression/initiate/review pumping guidelines and safe milk handling/initiate/review techniques for position and latch/post discharge community resources provided/review techniques to manage sore nipples/engorgement/initiate/review breast massage/compression/reviewed feeding on demand/by cue at least 8 times a day

## 2025-06-03 NOTE — LACTATION INITIAL EVALUATION - INTERVENTION OUTCOME
Assisted with deep latch and positioning in football and cross cradle B/L. Proper alignment was reviewed. Rounded on patient prior to discharge.  Instructed mother to use breastfeeding log to document feeds along with wet and dirty diapers. Encouraged to breastfeed the baby on demand for at least 8-12 times in a day based on feeding cues. Discussed when to consult with MD for assistance. Encouraged to see MD within 24-48 hours after discharge.  Pt instructed to notify pediatrician if nbn is not feeding 8-12 times/24 hours and not voiding and stooling according to breastfeeding log. The warm line and support group numbers were given and  encouraged. Patient verbalized understanding of education and information given./verbalizes understanding/good return demonstration

## 2025-06-19 ENCOUNTER — APPOINTMENT (OUTPATIENT)
Dept: MRI IMAGING | Facility: HOSPITAL | Age: 35
End: 2025-06-19

## 2025-06-19 ENCOUNTER — OUTPATIENT (OUTPATIENT)
Dept: OUTPATIENT SERVICES | Facility: HOSPITAL | Age: 35
LOS: 1 days | End: 2025-06-19
Payer: SUBSIDIZED

## 2025-06-19 DIAGNOSIS — Z00.00 ENCOUNTER FOR GENERAL ADULT MEDICAL EXAMINATION WITHOUT ABNORMAL FINDINGS: ICD-10-CM

## 2025-06-19 DIAGNOSIS — Z00.6 ENCOUNTER FOR EXAMINATION FOR NORMAL COMPARISON AND CONTROL IN CLINICAL RESEARCH PROGRAM: ICD-10-CM

## 2025-06-19 PROCEDURE — 70551 MRI BRAIN STEM W/O DYE: CPT | Mod: 26

## 2025-06-19 PROCEDURE — 70551 MRI BRAIN STEM W/O DYE: CPT

## 2025-07-01 NOTE — OB RN DELIVERY SUMMARY - AMNIOTIC FLUID AMOUNT, LABOR
07/01/25 1052   Rapid Rounds   Attendance Provider;Care Transitions   Expected Discharge Disposition Home H   Review at Escalation Rounds No escalation needed         Randa Zheng RN, BSN  Transitional Care Coordinator    within normal limits

## 2025-07-18 ENCOUNTER — APPOINTMENT (OUTPATIENT)
Dept: OBGYN | Facility: CLINIC | Age: 35
End: 2025-07-18
Payer: COMMERCIAL

## 2025-07-18 ENCOUNTER — NON-APPOINTMENT (OUTPATIENT)
Age: 35
End: 2025-07-18

## 2025-07-18 VITALS
DIASTOLIC BLOOD PRESSURE: 72 MMHG | WEIGHT: 165 LBS | HEIGHT: 65 IN | SYSTOLIC BLOOD PRESSURE: 109 MMHG | HEART RATE: 65 BPM | BODY MASS INDEX: 27.49 KG/M2

## 2025-07-18 PROBLEM — O21.9 NAUSEA/VOMITING IN PREGNANCY: Status: RESOLVED | Noted: 2024-10-08 | Resolved: 2025-07-18

## 2025-07-18 PROBLEM — Z32.01 PREGNANCY TEST POSITIVE: Status: RESOLVED | Noted: 2021-08-12 | Resolved: 2025-07-18

## 2025-07-18 PROBLEM — Z34.02 ENCOUNTER FOR PRENATAL CARE OF FIRST PREGNANCY, ANTEPARTUM, SECOND TRIMESTER: Status: RESOLVED | Noted: 2021-10-12 | Resolved: 2025-07-18

## 2025-07-18 PROBLEM — Z34.82 ENCOUNTER FOR SUPERVISION OF OTHER NORMAL PREGNANCY IN SECOND TRIMESTER: Status: RESOLVED | Noted: 2024-12-11 | Resolved: 2025-07-18

## 2025-07-18 PROBLEM — O36.60X0 LARGE FOR GESTATIONAL AGE FETUS AFFECTING MOTHER, ANTEPARTUM: Status: RESOLVED | Noted: 2022-02-01 | Resolved: 2025-07-18

## 2025-07-18 PROBLEM — Z34.81 ENCOUNTER FOR SUPERVISION OF OTHER NORMAL PREGNANCY IN FIRST TRIMESTER: Status: RESOLVED | Noted: 2024-11-14 | Resolved: 2025-07-18

## 2025-07-18 PROBLEM — Z34.03 ENCOUNTER FOR PRENATAL CARE OF FIRST PREGNANCY, ANTEPARTUM, THIRD TRIMESTER: Status: RESOLVED | Noted: 2022-01-06 | Resolved: 2025-07-18

## 2025-07-18 PROBLEM — N92.1 BREAKTHROUGH BLEEDING WITH IUD: Status: RESOLVED | Noted: 2023-05-15 | Resolved: 2025-07-18

## 2025-07-18 PROBLEM — Z34.83 ENCOUNTER FOR SUPERVISION OF OTHER NORMAL PREGNANCY IN THIRD TRIMESTER: Status: RESOLVED | Noted: 2025-03-05 | Resolved: 2025-07-18

## 2025-07-18 PROCEDURE — 0503F POSTPARTUM CARE VISIT: CPT
